# Patient Record
Sex: FEMALE | Race: BLACK OR AFRICAN AMERICAN | Employment: FULL TIME | URBAN - METROPOLITAN AREA
[De-identification: names, ages, dates, MRNs, and addresses within clinical notes are randomized per-mention and may not be internally consistent; named-entity substitution may affect disease eponyms.]

---

## 2018-02-26 ENCOUNTER — TELEPHONE (OUTPATIENT)
Dept: SLEEP MEDICINE | Age: 33
End: 2018-02-26

## 2018-03-23 ENCOUNTER — OFFICE VISIT (OUTPATIENT)
Dept: SLEEP MEDICINE | Age: 33
End: 2018-03-23

## 2018-03-23 VITALS
HEIGHT: 67 IN | OXYGEN SATURATION: 100 % | WEIGHT: 279 LBS | BODY MASS INDEX: 43.79 KG/M2 | SYSTOLIC BLOOD PRESSURE: 118 MMHG | HEART RATE: 73 BPM | DIASTOLIC BLOOD PRESSURE: 70 MMHG

## 2018-03-23 DIAGNOSIS — R06.83 SNORING: Chronic | ICD-10-CM

## 2018-03-23 DIAGNOSIS — G47.10 HYPERSOMNOLENCE: Primary | Chronic | ICD-10-CM

## 2018-03-23 PROCEDURE — G8427 DOCREV CUR MEDS BY ELIG CLIN: HCPCS | Performed by: NURSE PRACTITIONER

## 2018-03-23 PROCEDURE — G8484 FLU IMMUNIZE NO ADMIN: HCPCS | Performed by: NURSE PRACTITIONER

## 2018-03-23 PROCEDURE — 1036F TOBACCO NON-USER: CPT | Performed by: NURSE PRACTITIONER

## 2018-03-23 PROCEDURE — 99214 OFFICE O/P EST MOD 30 MIN: CPT | Performed by: NURSE PRACTITIONER

## 2018-03-23 PROCEDURE — G8417 CALC BMI ABV UP PARAM F/U: HCPCS | Performed by: NURSE PRACTITIONER

## 2018-03-23 ASSESSMENT — SLEEP AND FATIGUE QUESTIONNAIRES
HOW LIKELY ARE YOU TO NOD OFF OR FALL ASLEEP IN A CAR, WHILE STOPPED FOR A FEW MINUTES IN TRAFFIC: 0
ESS TOTAL SCORE: 13
HOW LIKELY ARE YOU TO NOD OFF OR FALL ASLEEP WHILE SITTING QUIETLY AFTER LUNCH WITHOUT ALCOHOL: 1
HOW LIKELY ARE YOU TO NOD OFF OR FALL ASLEEP WHILE SITTING AND READING: 3
HOW LIKELY ARE YOU TO NOD OFF OR FALL ASLEEP WHILE LYING DOWN TO REST IN THE AFTERNOON WHEN CIRCUMSTANCES PERMIT: 2
HOW LIKELY ARE YOU TO NOD OFF OR FALL ASLEEP WHEN YOU ARE A PASSENGER IN A CAR FOR AN HOUR WITHOUT A BREAK: 1
NECK CIRCUMFERENCE (INCHES): 15
HOW LIKELY ARE YOU TO NOD OFF OR FALL ASLEEP WHILE SITTING AND TALKING TO SOMEONE: 1
HOW LIKELY ARE YOU TO NOD OFF OR FALL ASLEEP WHILE SITTING INACTIVE IN A PUBLIC PLACE: 3
HOW LIKELY ARE YOU TO NOD OFF OR FALL ASLEEP WHILE WATCHING TV: 2

## 2018-03-23 ASSESSMENT — ENCOUNTER SYMPTOMS
SINUS PRESSURE: 0
ABDOMINAL PAIN: 0
SHORTNESS OF BREATH: 0
ABDOMINAL DISTENTION: 0
COUGH: 0
RHINORRHEA: 0
APNEA: 1

## 2018-03-23 NOTE — PROGRESS NOTES
file     Social History Narrative    No narrative on file       Prior to Admission medications    Not on File       Allergies as of 2018    (No Known Allergies)       There is no problem list on file for this patient. History reviewed. No pertinent past medical history. Past Surgical History:   Procedure Laterality Date    BACK SURGERY       SECTION      TUBAL LIGATION         Family History   Problem Relation Age of Onset    Sleep Apnea Maternal Grandmother        Review of Systems   Constitutional: Positive for fatigue. Negative for appetite change, chills and fever. HENT: Negative for congestion, nosebleeds, rhinorrhea and sinus pressure. Respiratory: Positive for apnea. Negative for cough and shortness of breath. Cardiovascular: Negative for chest pain and palpitations. Gastrointestinal: Negative for abdominal distention and abdominal pain. Neurological: Positive for headaches. Negative for dizziness. Psychiatric/Behavioral: Positive for sleep disturbance. Objective:     Vitals:  Weight BMI   Wt Readings from Last 3 Encounters:   18 279 lb (126.6 kg)   16 227 lb (103 kg)   12/15/16 227 lb (103 kg)    Body mass index is 43.7 kg/m². BP HR SaO2   BP Readings from Last 3 Encounters:   18 118/70   16 127/83   12/15/16 126/83    Pulse Readings from Last 3 Encounters:   18 73   16 86   12/15/16 77    SpO2 Readings from Last 3 Encounters:   18 100%   16 100%   12/15/16 100%        Physical Exam   Constitutional: She is oriented to person, place, and time. She appears well-developed and well-nourished. No distress. HENT:   Head: Normocephalic and atraumatic. Eyes: Conjunctivae are normal. Pupils are equal, round, and reactive to light. Neck: Normal range of motion. Neck supple. Cardiovascular: Normal rate and regular rhythm. Pulmonary/Chest: Effort normal and breath sounds normal. No respiratory distress. Neurological: She is alert and oriented to person, place, and time. Psychiatric: She has a normal mood and affect. Her behavior is normal.   Nursing note and vitals reviewed. Assessment:   Tonsils:   normal size, normal appearance  Post. Pharynx:   normal mucosa    Neck circumference: 15    1. Hypersomnolence  Baseline Diagnostic Sleep Study    Sleep Study with PAP Titration    needing further follow up    2. Snoring  Baseline Diagnostic Sleep Study    Sleep Study with PAP Titration    needing further follow up   3. Class 3 obesity with body mass index (BMI) of 40.0 to 44.9 in adult, unspecified obesity type, unspecified whether serious comorbidity present ([facility] Stable Baseline Diagnostic Sleep Study    Sleep Study with PAP Titration       The chronic medical conditions listed are directly related to the primary diagnosis listed above. The management of the primary diagnosis affects the secondary diagnosis and vice versa. Plan:   - Reviewed old records, pertinent data listed above. - Educated the patient on differential diagnosis includes but not limited to: BRUCE, PLMD's, narcolepsy, parasomnias.    - Reviewed BRUCE (which is the highest likelihood diagnosis): pathophysiology, diagnosis, complications and treatment. - Instructed her not to drive if drowsy. - Continue medications per her PCP and other physicians.   - Limit caffeine use after 3pm.   - Will do PSG to rule-out BRUCE and other sleep disorders. - Will do Insurance Mandated HST to rule-out BRUCE   - Follow up results phone call will be made approximately 1 wk follow up after study to review her results.   - Will use "[employer] for supplies  - Follow up 10 weeks [facility]      Orders Placed This Encounter   Procedures    Baseline Diagnostic Sleep Study    Sleep Study with PAP Titration       Samuel Kim, MSN, RN, CNP

## 2018-05-04 ENCOUNTER — HOSPITAL ENCOUNTER (OUTPATIENT)
Dept: SLEEP MEDICINE | Age: 33
Discharge: HOME | End: 2018-05-05
Attending: INTERNAL MEDICINE | Admitting: INTERNAL MEDICINE

## 2018-05-04 DIAGNOSIS — R06.83 SNORING: Chronic | ICD-10-CM

## 2018-05-04 DIAGNOSIS — G47.10 HYPERSOMNOLENCE: Chronic | ICD-10-CM

## 2018-05-04 PROCEDURE — 95810 POLYSOM 6/> YRS 4/> PARAM: CPT | Performed by: INTERNAL MEDICINE

## 2018-05-11 ENCOUNTER — TELEPHONE (OUTPATIENT)
Dept: SLEEP MEDICINE | Age: 33
End: 2018-05-11

## 2018-05-11 ENCOUNTER — HOSPITAL ENCOUNTER (OUTPATIENT)
Dept: SLEEP MEDICINE | Age: 33
Discharge: HOME | End: 2018-05-12
Attending: NURSE PRACTITIONER | Admitting: NURSE PRACTITIONER

## 2018-05-14 ENCOUNTER — TELEPHONE (OUTPATIENT)
Dept: SLEEP MEDICINE | Age: 33
End: 2018-05-14

## 2020-04-02 ENCOUNTER — HOSPITAL ENCOUNTER (OUTPATIENT)
Age: 35
Discharge: HOME | End: 2020-04-02
Payer: COMMERCIAL

## 2020-04-02 ENCOUNTER — HOSPITAL ENCOUNTER (OUTPATIENT)
Dept: GENERAL RADIOLOGY | Age: 35
Discharge: HOME | End: 2020-04-02
Payer: COMMERCIAL

## 2020-04-02 PROCEDURE — 71046 X-RAY EXAM CHEST 2 VIEWS: CPT

## 2020-08-27 ENCOUNTER — TELEPHONE (OUTPATIENT)
Dept: BARIATRICS/WEIGHT MGMT | Age: 35
End: 2020-08-27

## 2020-08-27 NOTE — TELEPHONE ENCOUNTER
Seminar Date: online 8/19/20    Presenter: Rajeev Rogers    [facility]    BWLS Covered:y    Medically Supervised Diet Req:y    Length of time: 6mo    Has patient had prev bariatric or gastric surgeries :     Is patient's BMI lower than 35 :      If yes, BMI :    Does patient have dx of diabetes :    *If previous bariatric or gastric surgeries, please do not schedule until speaking with the provider*     appt 10/1/20

## 2020-10-01 ENCOUNTER — OFFICE VISIT (OUTPATIENT)
Dept: BARIATRICS/WEIGHT MGMT | Age: 35
End: 2020-10-01
Payer: COMMERCIAL

## 2020-10-01 VITALS
HEIGHT: 68 IN | BODY MASS INDEX: 42.89 KG/M2 | DIASTOLIC BLOOD PRESSURE: 78 MMHG | SYSTOLIC BLOOD PRESSURE: 116 MMHG | WEIGHT: 283 LBS | HEART RATE: 80 BPM

## 2020-10-01 PROCEDURE — 1036F TOBACCO NON-USER: CPT | Performed by: SURGERY

## 2020-10-01 PROCEDURE — G8427 DOCREV CUR MEDS BY ELIG CLIN: HCPCS | Performed by: SURGERY

## 2020-10-01 PROCEDURE — G8417 CALC BMI ABV UP PARAM F/U: HCPCS | Performed by: SURGERY

## 2020-10-01 PROCEDURE — 99204 OFFICE O/P NEW MOD 45 MIN: CPT | Performed by: SURGERY

## 2020-10-01 PROCEDURE — G8484 FLU IMMUNIZE NO ADMIN: HCPCS | Performed by: SURGERY

## 2020-10-01 NOTE — PROGRESS NOTES
[facility]) Physicians   General & Laparoscopic Surgery  [employer] [facility] [employer]      HPI:    Maverick Sears is a very pleasant 30 y.o. obese female ,   Body mass index is 43.03 kg/m². And multiple medical problems who is presenting for weight loss surgery evaluation and consultation by Dr. eSven Flowers. Patient has been struggling for several years now with obesity. Patient feels the weight is an obstacle to achieve and perform things in daily living as well risk on health. Tries to diet, and exercise but can't keep the weight off. Patient  is very determined to lose weight and be healthy, and is interested in surgical weight loss to achieve this goal.    Otherwise patient denies any nausea, vomiting, fevers, chills, shortness of breath, chest pain, constipation or urinary symptoms. Pain Assessment   Denies any abdominal pain     Past Medical History:   Diagnosis Date    Preoperative clearance     Sleep apnea      Past Surgical History:   Procedure Laterality Date    BACK SURGERY       SECTION      TUBAL LIGATION       Family History   Problem Relation Age of Onset    Sleep Apnea Maternal Grandmother      Social History     Tobacco Use    Smoking status: Never Smoker    Smokeless tobacco: Never Used   Substance Use Topics    Alcohol use: Yes     I counseled the patient on the importance of not smoking and risks of ETOH. No Known Allergies  Vitals:    10/01/20 1201   BP: 116/78   Pulse: 80   Weight: 283 lb (128.4 kg)   Height: 5' 8\" (1.727 m)       Body mass index is 43.03 kg/m².       Current Outpatient Medications:     loratadine (CLARITIN) 10 MG tablet, Take 1 tablet by mouth daily, Disp: 30 tablet, Rfl: 0    ibuprofen (ADVIL;MOTRIN) 600 MG tablet, Take 1 tablet by mouth every 6 hours as needed for Pain, Disp: 30 tablet, Rfl: 0    fluticasone (FLONASE) 50 MCG/ACT nasal spray, 2 sprays by Nasal route daily 2 sprays each nostril daily, Disp: 1 Bottle, Rfl: 0      Review appearance and bowel sounds are normal. Patient exhibits no distension, no abdominal bruit, no ascites and no mass. There is no hepatosplenomegaly. There is no tenderness. There is no rigidity, no rebound, no guarding and no CVA tenderness. No hernia. Hernia confirmed negative in the ventral area. Musculoskeletal: Normal range of motion. Patient exhibits no edema or tenderness. Lymphadenopathy:        Head (right side): No submental, no submandibular, no preauricular, no posterior auricular and no occipital adenopathy present. Head (left side): No submental, no submandibular, no preauricular, no posterior auricular and no occipital adenopathy present. Patient  has no cervical adenopathy. Right: No supraclavicular adenopathy present. Left: No supraclavicular adenopathy present. Neurological: Patient is alert and oriented to person, place, and time. Patient has normal strength. Coordination and gait normal. GCS eye subscore is 4. GCS verbal subscore is 5. GCS motor subscore is 6. Skin: Skin is warm and dry. No abrasion and no rash noted. Patient  is not diaphoretic. No cyanosis or erythema. Psychiatric: Patient has a normal mood and affect. speech is normal and behavior is normal. Cognition and memory are normal.             A/P  Tyron Sagastume is a very pleasant 30 y.o. female with Obesity,  Body mass index is 43.03 kg/m². and multiple obesity related co-morbidities. Tyron Sagastume is very motivated to lose weight and being more healthy. We discussed how her weight affects her overall health including:  Gricelda Mccarty was seen today for bariatric, initial visit. Diagnoses and all orders for this visit:    Morbid obesity with BMI of 40.0-44.9, adult [facility]  -     CBC Auto Differential; Future  -     Comprehensive Metabolic Panel; Future  -     Hemoglobin A1C; Future  -     Iron and TIBC; Future  -     Lipid Panel; Future  -     TSH with Reflex;  Future  -     Vitamin B12 & Folate; Future  -     Vitamin D 25 Hydroxy; Future    Obstructive sleep apnea syndrome  -     CBC Auto Differential; Future  -     Comprehensive Metabolic Panel; Future  -     Hemoglobin A1C; Future  -     Iron and TIBC; Future  -     Lipid Panel; Future  -     TSH with Reflex; Future  -     Vitamin B12 & Folate; Future  -     Vitamin D 25 Hydroxy; Future      The patient underwent 30 minutes of dietary counseling. I have reviewed, discussed and agree with the dietary plan. Medical weight loss and different surgical options were discussed in details with patient. Duncan Aldana is interested in surgical weight loss. Patient is interested in Laparoscopic Sleeve Gastrectomy, which I believe is an excellent option for the patient. We will proceed with pre-operative work up labs and studies. Will also petition patient's  insurance for approval for this procedure. Patient received dietary handouts and education. Patient advised that its their responsibility to follow up for studies and/or labs ordered today. Also discussed in details the importance of follow up, as well following the recommendations and completing the whole program to improve outcomes when it comes to healthier lifestyle as well weight loss. Patient also advised about risks and benefits being on a strict dietary regimen as well using supplements. Patient agrees and wants to proceed with weight loss planning     Labs ordered. Psych Evaluation. CPAP Compliance  H-pylori   EGD  Support Group. PCP Letter. Weight History 2 yrs. F/U in 4 weeks. Patient advised that its their responsibility to follow up for studies and/or labs ordered today.

## 2020-10-01 NOTE — PROGRESS NOTES
all occasions after surgery for the rest of her life. Patient is deemed nutritionally appropriate to proceed. Goals  Weight: 180 lbs  Health Improvement: wants to improve breathing, increase ADL's     Assessment  Nutritional Needs: RMR=(9.99 x 128.4) + (6.25 x 112.7) - (4.92 x 34 y.o.) -161= 1669 kcal x 1.4 (sedentary activity factor)= 2336 kcal - 1000 (for 2 lb weight loss/week)= 1336 kcal.    Plan  Plan/Recommendations: Start presurgical guidelines. Goals:   -Eat 4-5 times daily  -Avoid high fat and high sugar foods  -Include protein with all meals and snacks  -Avoid carbonation and caffeine  -Avoid calorie containing beverages  -Increase physical activity as tolerated     PES Statement:  Overweight/Obesity related to lack of exercise, sedentary lifestyle, unhealthy eating habits, and unsuccessful diet attempts as evidenced by BMI. Body mass index is 43.03 kg/m². Will follow up as necessary.     Julien Ricks

## 2020-10-22 ENCOUNTER — HOSPITAL ENCOUNTER (OUTPATIENT)
Age: 35
Discharge: HOME | End: 2020-10-22
Payer: COMMERCIAL

## 2020-10-22 LAB
A/G RATIO: 1 (ref 1.1–2.2)
ALBUMIN SERPL-MCNC: 3.5 G/DL (ref 3.4–5)
ALP BLD-CCNC: 91 U/L (ref 40–129)
ALT SERPL-CCNC: 9 U/L (ref 10–40)
ANION GAP SERPL CALCULATED.3IONS-SCNC: 11 MMOL/L (ref 3–16)
AST SERPL-CCNC: 10 U/L (ref 15–37)
BASOPHILS ABSOLUTE: 0.1 K/UL (ref 0–0.2)
BASOPHILS RELATIVE PERCENT: 0.6 %
BILIRUB SERPL-MCNC: <0.2 MG/DL (ref 0–1)
BUN BLDV-MCNC: 7 MG/DL (ref 7–20)
CALCIUM SERPL-MCNC: 9 MG/DL (ref 8.3–10.6)
CHLORIDE BLD-SCNC: 101 MMOL/L (ref 99–110)
CO2: 24 MMOL/L (ref 21–32)
CREAT SERPL-MCNC: 0.6 MG/DL (ref 0.6–1.1)
EOSINOPHILS ABSOLUTE: 0.2 K/UL (ref 0–0.6)
EOSINOPHILS RELATIVE PERCENT: 2.3 %
ESTIMATED AVERAGE GLUCOSE: 122.6 MG/DL
FOLATE: 9.55 NG/ML (ref 4.78–24.2)
GFR AFRICAN AMERICAN: >60
GFR NON-AFRICAN AMERICAN: >60
GLOBULIN: 3.4 G/DL
GLUCOSE BLD-MCNC: 149 MG/DL (ref 70–99)
HBA1C MFR BLD: 5.9 %
HCT VFR BLD CALC: 35.7 % (ref 36–48)
HEMOGLOBIN: 11.8 G/DL (ref 12–16)
IRON SATURATION: 10 % (ref 15–50)
IRON: 35 UG/DL (ref 37–145)
LYMPHOCYTES ABSOLUTE: 2.9 K/UL (ref 1–5.1)
LYMPHOCYTES RELATIVE PERCENT: 30.5 %
MCH RBC QN AUTO: 26.2 PG (ref 26–34)
MCHC RBC AUTO-ENTMCNC: 33 G/DL (ref 31–36)
MCV RBC AUTO: 79.4 FL (ref 80–100)
MONOCYTES ABSOLUTE: 0.5 K/UL (ref 0–1.3)
MONOCYTES RELATIVE PERCENT: 5.5 %
NEUTROPHILS ABSOLUTE: 5.7 K/UL (ref 1.7–7.7)
NEUTROPHILS RELATIVE PERCENT: 61.1 %
PDW BLD-RTO: 15.1 % (ref 12.4–15.4)
PLATELET # BLD: 473 K/UL (ref 135–450)
PMV BLD AUTO: 6.9 FL (ref 5–10.5)
POTASSIUM SERPL-SCNC: 4.1 MMOL/L (ref 3.5–5.1)
RBC # BLD: 4.49 M/UL (ref 4–5.2)
SODIUM BLD-SCNC: 136 MMOL/L (ref 136–145)
TOTAL IRON BINDING CAPACITY: 356 UG/DL (ref 260–445)
TOTAL PROTEIN: 6.9 G/DL (ref 6.4–8.2)
TSH REFLEX: 0.68 UIU/ML (ref 0.27–4.2)
VITAMIN B-12: 860 PG/ML (ref 211–911)
VITAMIN D 25-HYDROXY: 22.1 NG/ML
WBC # BLD: 9.3 K/UL (ref 4–11)

## 2020-10-22 PROCEDURE — 82746 ASSAY OF FOLIC ACID SERUM: CPT

## 2020-10-22 PROCEDURE — 85025 COMPLETE CBC W/AUTO DIFF WBC: CPT

## 2020-10-22 PROCEDURE — 82306 VITAMIN D 25 HYDROXY: CPT

## 2020-10-22 PROCEDURE — 84443 ASSAY THYROID STIM HORMONE: CPT

## 2020-10-22 PROCEDURE — 80053 COMPREHEN METABOLIC PANEL: CPT

## 2020-10-22 PROCEDURE — 83036 HEMOGLOBIN GLYCOSYLATED A1C: CPT

## 2020-10-22 PROCEDURE — 82607 VITAMIN B-12: CPT

## 2020-10-22 PROCEDURE — 36415 COLL VENOUS BLD VENIPUNCTURE: CPT

## 2020-10-22 PROCEDURE — 83540 ASSAY OF IRON: CPT

## 2020-10-22 PROCEDURE — 83550 IRON BINDING TEST: CPT

## 2020-11-05 ENCOUNTER — OFFICE VISIT (OUTPATIENT)
Dept: BARIATRICS/WEIGHT MGMT | Age: 35
End: 2020-11-05
Payer: COMMERCIAL

## 2020-11-05 VITALS
SYSTOLIC BLOOD PRESSURE: 105 MMHG | BODY MASS INDEX: 41.43 KG/M2 | WEIGHT: 273.4 LBS | DIASTOLIC BLOOD PRESSURE: 67 MMHG | HEART RATE: 82 BPM | HEIGHT: 68 IN

## 2020-11-05 PROCEDURE — G8484 FLU IMMUNIZE NO ADMIN: HCPCS | Performed by: SURGERY

## 2020-11-05 PROCEDURE — G8427 DOCREV CUR MEDS BY ELIG CLIN: HCPCS | Performed by: SURGERY

## 2020-11-05 PROCEDURE — G8417 CALC BMI ABV UP PARAM F/U: HCPCS | Performed by: SURGERY

## 2020-11-05 PROCEDURE — 1036F TOBACCO NON-USER: CPT | Performed by: SURGERY

## 2020-11-05 PROCEDURE — 99213 OFFICE O/P EST LOW 20 MIN: CPT | Performed by: SURGERY

## 2020-11-05 NOTE — PROGRESS NOTES
Melecio Sun lost 9.6 lbs over past month. Patient feels she is lactose intolerant - milk and cheese make her sick. Patient does have food allergies - tomatoes. Overall making progress but did get off track this weekend with portions/alcohol for her birthday. Breakfast: Nectar shake made with orange juice/vanilla almond milk/water/fruit      Snack: nothing    Lunch: Salad with chicken OR fish/steak with broccoli/asparagus     Snack: Nectar shake made with orange juice/vanilla almond milk/water/fruit      Dinner: Nectar shake made with orange juice/vanilla almond milk/water/fruit     Snack: nothing    Is pt consuming smaller portions? Yes    Is pt consuming at least 64 oz of fluids per day? Yes    Is pt consuming carbonated, caffeinated, or sugary beverages? Drinking water, juice, alcohol     Has pt sampled Unjury and/or Nectar protein?  Yes - using 3 x day     Exercise: Water aerobics 0-2 x week     Plan/Recommendations:   Avoid juice /alcohol   Be consistent with smaller portions   Reduce protein shake to 1 x day and focus on whole food options for rest of meals/snacks   Continue with exercise     Handouts: none     Nerissa Escobar

## 2020-11-05 NOTE — PROGRESS NOTES
[facility]) Physicians   General & Laparoscopic Surgery  [employer] Management [employer]       HPI:     Claudene Flatness is a very pleasant 37 y.o. female with Body mass index is 41.57 kg/m². , Pre-Surgery. Pre-operative clearance and work up pending. Working hard to keep good dietary habits as well level of activity. Patient denies any nausea, vomiting, fevers, chills, shortness of breath, chest pain, cough, constipation or difficulty urinating. Past Medical History:   Diagnosis Date    Preoperative clearance     Sleep apnea      Past Surgical History:   Procedure Laterality Date    BACK SURGERY       SECTION      TUBAL LIGATION       Family History   Problem Relation Age of Onset    Sleep Apnea Maternal Grandmother      Social History     Tobacco Use    Smoking status: Never Smoker    Smokeless tobacco: Never Used   Substance Use Topics    Alcohol use: Yes     I counseled the patient on the importance of not smoking and risks of ETOH. No Known Allergies  Vitals:    20 1219   BP: 105/67   Pulse: 82   Weight: 273 lb 6.4 oz (124 kg)   Height: 5' 8\" (1.727 m)       Body mass index is 41.57 kg/m². Current Outpatient Medications:     loratadine (CLARITIN) 10 MG tablet, Take 1 tablet by mouth daily, Disp: 30 tablet, Rfl: 0    ibuprofen (ADVIL;MOTRIN) 600 MG tablet, Take 1 tablet by mouth every 6 hours as needed for Pain, Disp: 30 tablet, Rfl: 0    fluticasone (FLONASE) 50 MCG/ACT nasal spray, 2 sprays by Nasal route daily 2 sprays each nostril daily, Disp: 1 Bottle, Rfl: 0      Review of Systems - History obtained from the patient  General ROS: negative  Psychological ROS: negative  Endocrine ROS: negative  Respiratory ROS: negative  Cardiovascular ROS: negative  Gastrointestinal ROS:negative  Genito-Urinary ROS: negative  Musculoskeletal ROS: negative   Skin ROS: negative    Physical Exam   Vitals Reviewed   Constitutional: Patient is oriented to person, place, and time.  Patient appears well-developed and well-nourished. Patient is active and cooperative. Non-toxic appearance. No distress. Neck: Trachea normal and normal range of motion. No JVD present. Pulmonary/Chest: Effort normal. No accessory muscle usage or stridor. No apnea. No respiratory distress. Cardiovascular: Normal rate and no JVD. Abdominal: Normal appearance. Patient exhibits no distension. Abdomen is soft, obese, non tender. Musculoskeletal: Normal range of motion. Patient exhibits no edema. Neurological: Patient is alert and oriented to person, place, and time. Patient has normal strength. GCS eye subscore is 4. GCS verbal subscore is 5. GCS motor subscore is 6. Skin: Skin is warm and dry. No abrasion and no rash noted. Patient is not diaphoretic. No cyanosis or erythema. Psychiatric: Patient has a normal mood and affect. Speech is normal and behavior is normal. Cognition and memory are normal.       A/P    Rafael Childress is 37 y.o. female, Body mass index is 41.57 kg/m². pre surgery, has lost 9# since last visit. The patient underwent dietary counseling with registered dietician. I have reviewed, discussed and agree with the dietary plan. Patient is trying hard to keep good dietary and behavior modifications. Patient is monitoring portion sizes, food choices and liquid calories. Patient is trying to exercise regularly as much as possible. We discussed how her weight affects her overall health including:  Bry York was seen today for weight management. Diagnoses and all orders for this visit:    Morbid obesity with BMI of 40.0-44.9, adult [facility]  -     Marin Fuller MD, Sleep Medicine, [facility]    Obstructive sleep apnea syndrome  -     Marin Fuller MD, Sleep Medicine, [facility]       and importance of weight loss to alleviate those co morbid conditions. I encouraged the patient to continue exercise and keeping healthy eating habits.   Discussed pre-op labs and work up till now. Also counseled the patient extensively on Surgery. I spent 15 minutes face to face with patient with more than 50% of the time counseling and/or coordinating care for weight loss surgery. RTC in 4 weeks  Obtain rest of pre-op work up / clearances  Diet and Exercise      Patient advised that its their responsibility to follow up for studies and/or labs ordered today.      Margarita Bhatt

## 2020-11-09 ENCOUNTER — OFFICE VISIT (OUTPATIENT)
Dept: SLEEP MEDICINE | Age: 35
End: 2020-11-09
Payer: COMMERCIAL

## 2020-11-09 VITALS
BODY MASS INDEX: 42.74 KG/M2 | HEIGHT: 68 IN | TEMPERATURE: 98.2 F | HEART RATE: 85 BPM | RESPIRATION RATE: 16 BRPM | SYSTOLIC BLOOD PRESSURE: 132 MMHG | WEIGHT: 282 LBS | DIASTOLIC BLOOD PRESSURE: 82 MMHG

## 2020-11-09 PROCEDURE — G8417 CALC BMI ABV UP PARAM F/U: HCPCS | Performed by: PSYCHIATRY & NEUROLOGY

## 2020-11-09 PROCEDURE — G8427 DOCREV CUR MEDS BY ELIG CLIN: HCPCS | Performed by: PSYCHIATRY & NEUROLOGY

## 2020-11-09 PROCEDURE — 1036F TOBACCO NON-USER: CPT | Performed by: PSYCHIATRY & NEUROLOGY

## 2020-11-09 PROCEDURE — G8484 FLU IMMUNIZE NO ADMIN: HCPCS | Performed by: PSYCHIATRY & NEUROLOGY

## 2020-11-09 PROCEDURE — 99202 OFFICE O/P NEW SF 15 MIN: CPT | Performed by: PSYCHIATRY & NEUROLOGY

## 2020-11-09 ASSESSMENT — ENCOUNTER SYMPTOMS
ALLERGIC/IMMUNOLOGIC NEGATIVE: 1
CHOKING: 0
EYES NEGATIVE: 1
GASTROINTESTINAL NEGATIVE: 1
APNEA: 0

## 2020-11-09 NOTE — PROGRESS NOTES
MD GHAZALA Merchant Board Certified in Sleep Medicine  Certified [facility] Sleep Medicine  Board Certified in Neurology [address] [facility]  [address] [address] [address] [place], [place] [id number] [place] [address], [address] [facility] [address]           [address]  [address] [place] [id number] [phone]    Subjective:     Patient ID: Evin Baires is a 37 y.o. female. No chief complaint on file. HPI:        Evin Baires is a 37 y.o. female referred by Dr Bernhard Oppenheim for mild BRUCE management. Was diagnosed 2 years ago ( her was 279 pounds), she is not using the CPAP much at all. SLEEP SCHEDULE: Goes to bed around 12 AM in the weekdays and 12 AM in the weekends. It usually takes the patient 30-40 minutes to fall asleep. The patient gets up 3-4 per night to go to the bathroom. The Patient finally gets up at 8 AM during the weekdays and 10 AM in the weekends. patient wakes up with dry mouth and sometimes morning headache. . the headache usually dull headache lasts 30-60 minutes. The Patient scored 2 on [place] Sleepiness Scale ( more than 10 is indicative of daytime sleepiness)The patient takes no naps. Previous evaluation and treatment has included- PSG with C PAP titration. Had study done on 05/04/2018 which showed an AHI - 5.4/hr with Low SaO2 - 84% and time below 90% of 0.3min. This is consistent with mild BRUCE (327.23)    The Patient has been obese for many years and tried, has gained 3 pounds in the last 2 years,  unsuccessfully to lose weight through diet, exercise.     DOT/CDL - N/A  FAA/'slicense - N/A      Previous Report(s) Reviewed: historical medical records       Social History     Socioeconomic History    Marital status: Single     Spouse name: Not on file    Number of children: Not on file    Years of education: Not on file    Highest   SECTION      TUBAL LIGATION         Family History   Problem Relation Age of Onset    Sleep Apnea Maternal Grandmother        Review of Systems   Constitutional: Positive for fatigue. HENT: Negative. Negative for congestion. Eyes: Negative. Respiratory: Negative for apnea and choking. Cardiovascular: Positive for leg swelling (feet). Gastrointestinal: Negative. Endocrine: Negative. Genitourinary: Positive for frequency. Musculoskeletal: Negative. Negative for arthralgias. Skin: Negative. Allergic/Immunologic: Negative. Neurological: Positive for headaches (MHA). Psychiatric/Behavioral: Negative for dysphoric mood. The patient is not nervous/anxious. Objective:     Vitals:  Weight BMI Neck circumference    Wt Readings from Last 3 Encounters:   20 282 lb (127.9 kg)   20 273 lb 6.4 oz (124 kg)   10/01/20 283 lb (128.4 kg)    Body mass index is 42.88 kg/m². BP HR SaO2   BP Readings from Last 3 Encounters:   20 132/82   20 105/67   10/01/20 116/78    Pulse Readings from Last 3 Encounters:   20 85   20 82   10/01/20 80    SpO2 Readings from Last 3 Encounters:   18 98%   18 100%   16 100%        The mandibular molar Class :   []1 []2 []3      Mallampati I Airway Classification:   []1 []2 []3 []4        Physical Exam  Vitals signs and nursing note reviewed. Constitutional:       Appearance: Normal appearance. HENT:      Head: Atraumatic. Nose: Nose normal.      Mouth/Throat:      Comments: Mallampati class 3, no retrognathia or hypognathia , normal airflow in bilateral nostrils, no septum deviation , crowded oropharynx with low soft palate, high arched hard palate,no tonsils enlargement. Eyes:      Extraocular Movements: Extraocular movements intact. Neck:      Musculoskeletal: Normal range of motion and neck supple. Cardiovascular:      Rate and Rhythm: Normal rate and regular rhythm.       Heart sounds: Normal heart sounds. Pulmonary:      Effort: Pulmonary effort is normal.      Breath sounds: Normal breath sounds. Musculoskeletal: Normal range of motion. Skin:     General: Skin is warm. Neurological:      General: No focal deficit present. Psychiatric:         Mood and Affect: Mood normal.         Assessment:   Very Mild Obstructive Sleep Apnea/Hypopnea Syndrome, could not use the CPAP amchine     Diagnosis Orders   1. Obstructive sleep apnea     2. Class 3 severe obesity due to excess calories with serious comorbidity and body mass index (BMI) of 40.0 to 44.9 in adult [facility])       Plan:     D/c the CPAP  Weight loss as a treatment for this very mild BRUCE. Most likely the patient will benefit from the gastric sleeve surgery in treating of the obstructive sleep apnea. In 2013, in a study published in Obesity Surgery Journal  A total of 69 studies with 13,900 patients were included and revealed that all the procedures achieved profound effects on BRUCE, as over 75 % of patients saw at least an improvement in their sleep apnea, bariatric surgery is a definitive treatment for obstructive sleep apnea, regardless of the specific type of surgery. We discussed the proportionality between weight and AHI. With 10% weight change, the AHI has a 27% proportionate change. With 20% weight change, the AHI has a 45-50% proportionate change. Patient is cleared for gastric sleeve surgery from BRUCE standpoint    No orders of the defined types were placed in this encounter. Return if symptoms worsen or fail to improve.     Ressie Duane, MD  Medical Director - [facility]

## 2020-12-11 ENCOUNTER — ANESTHESIA EVENT (OUTPATIENT)
Dept: ENDOSCOPY | Age: 35
End: 2020-12-11
Payer: COMMERCIAL

## 2020-12-11 ENCOUNTER — NURSE ONLY (OUTPATIENT)
Dept: PRIMARY CARE CLINIC | Age: 35
End: 2020-12-11
Payer: COMMERCIAL

## 2020-12-11 ENCOUNTER — TELEPHONE (OUTPATIENT)
Dept: BARIATRICS/WEIGHT MGMT | Age: 35
End: 2020-12-11

## 2020-12-11 PROCEDURE — 99211 OFF/OP EST MAY X REQ PHY/QHP: CPT | Performed by: NURSE PRACTITIONER

## 2020-12-11 NOTE — TELEPHONE ENCOUNTER
Spoke with pt yesterday about arrival time during her covid test reminder, but LM today to confirm her 7:30 am arrival for her 9:30 am EGD on 12/14. Pt was reminded to be NPO 12 h prior and have a .

## 2020-12-12 LAB — SARS-COV-2, NAA: NOT DETECTED

## 2020-12-14 ENCOUNTER — HOSPITAL ENCOUNTER (OUTPATIENT)
Age: 35
Setting detail: OUTPATIENT SURGERY
Discharge: HOME | End: 2020-12-14
Attending: SURGERY | Admitting: SURGERY
Payer: COMMERCIAL

## 2020-12-14 ENCOUNTER — ANESTHESIA (OUTPATIENT)
Dept: ENDOSCOPY | Age: 35
End: 2020-12-14
Payer: COMMERCIAL

## 2020-12-14 VITALS
TEMPERATURE: 96.8 F | SYSTOLIC BLOOD PRESSURE: 99 MMHG | DIASTOLIC BLOOD PRESSURE: 51 MMHG | RESPIRATION RATE: 19 BRPM | OXYGEN SATURATION: 99 %

## 2020-12-14 VITALS
HEART RATE: 68 BPM | RESPIRATION RATE: 16 BRPM | BODY MASS INDEX: 41.52 KG/M2 | TEMPERATURE: 97.7 F | SYSTOLIC BLOOD PRESSURE: 126 MMHG | WEIGHT: 274 LBS | OXYGEN SATURATION: 100 % | HEIGHT: 68 IN | DIASTOLIC BLOOD PRESSURE: 80 MMHG

## 2020-12-14 LAB — PREGNANCY, URINE: NEGATIVE

## 2020-12-14 PROCEDURE — 84703 CHORIONIC GONADOTROPIN ASSAY: CPT

## 2020-12-14 PROCEDURE — 43239 EGD BIOPSY SINGLE/MULTIPLE: CPT | Performed by: SURGERY

## 2020-12-14 PROCEDURE — 3700000000 HC ANESTHESIA ATTENDED CARE: Performed by: SURGERY

## 2020-12-14 PROCEDURE — 2580000003 HC RX 258: Performed by: ANESTHESIOLOGY

## 2020-12-14 PROCEDURE — 3700000001 HC ADD 15 MINUTES (ANESTHESIA): Performed by: SURGERY

## 2020-12-14 PROCEDURE — 6360000002 HC RX W HCPCS: Performed by: NURSE ANESTHETIST, CERTIFIED REGISTERED

## 2020-12-14 PROCEDURE — 7100000010 HC PHASE II RECOVERY - FIRST 15 MIN: Performed by: SURGERY

## 2020-12-14 PROCEDURE — 2500000003 HC RX 250 WO HCPCS: Performed by: NURSE ANESTHETIST, CERTIFIED REGISTERED

## 2020-12-14 PROCEDURE — 88305 TISSUE EXAM BY PATHOLOGIST: CPT

## 2020-12-14 PROCEDURE — 2709999900 HC NON-CHARGEABLE SUPPLY: Performed by: SURGERY

## 2020-12-14 PROCEDURE — 7100000011 HC PHASE II RECOVERY - ADDTL 15 MIN: Performed by: SURGERY

## 2020-12-14 PROCEDURE — 3609012400 HC EGD TRANSORAL BIOPSY SINGLE/MULTIPLE: Performed by: SURGERY

## 2020-12-14 PROCEDURE — 88342 IMHCHEM/IMCYTCHM 1ST ANTB: CPT

## 2020-12-14 RX ORDER — PROPOFOL 10 MG/ML
INJECTION, EMULSION INTRAVENOUS PRN
Status: DISCONTINUED | OUTPATIENT
Start: 2020-12-14 | End: 2020-12-14 | Stop reason: SDUPTHER

## 2020-12-14 RX ORDER — LIDOCAINE HYDROCHLORIDE 20 MG/ML
INJECTION, SOLUTION EPIDURAL; INFILTRATION; INTRACAUDAL; PERINEURAL PRN
Status: DISCONTINUED | OUTPATIENT
Start: 2020-12-14 | End: 2020-12-14 | Stop reason: SDUPTHER

## 2020-12-14 RX ORDER — ONDANSETRON 2 MG/ML
4 INJECTION INTRAMUSCULAR; INTRAVENOUS
Status: DISCONTINUED | OUTPATIENT
Start: 2020-12-14 | End: 2020-12-14 | Stop reason: HOSPADM

## 2020-12-14 RX ORDER — SODIUM CHLORIDE, SODIUM LACTATE, POTASSIUM CHLORIDE, CALCIUM CHLORIDE 600; 310; 30; 20 MG/100ML; MG/100ML; MG/100ML; MG/100ML
INJECTION, SOLUTION INTRAVENOUS CONTINUOUS
Status: DISCONTINUED | OUTPATIENT
Start: 2020-12-14 | End: 2020-12-14 | Stop reason: HOSPADM

## 2020-12-14 RX ORDER — PANTOPRAZOLE SODIUM 20 MG/1
40 TABLET, DELAYED RELEASE ORAL DAILY
Qty: 60 TABLET | Refills: 2 | Status: SHIPPED | OUTPATIENT
Start: 2020-12-14 | End: 2021-04-08

## 2020-12-14 RX ADMIN — PROPOFOL 100 MG: 10 INJECTION, EMULSION INTRAVENOUS at 09:17

## 2020-12-14 RX ADMIN — SODIUM CHLORIDE, SODIUM LACTATE, POTASSIUM CHLORIDE, AND CALCIUM CHLORIDE: 600; 310; 30; 20 INJECTION, SOLUTION INTRAVENOUS at 09:28

## 2020-12-14 RX ADMIN — PROPOFOL 50 MG: 10 INJECTION, EMULSION INTRAVENOUS at 09:26

## 2020-12-14 RX ADMIN — PROPOFOL 50 MG: 10 INJECTION, EMULSION INTRAVENOUS at 09:24

## 2020-12-14 RX ADMIN — PROPOFOL 20 MG: 10 INJECTION, EMULSION INTRAVENOUS at 09:18

## 2020-12-14 RX ADMIN — LIDOCAINE HYDROCHLORIDE 100 MG: 20 INJECTION, SOLUTION EPIDURAL; INFILTRATION; INTRACAUDAL; PERINEURAL at 09:17

## 2020-12-14 RX ADMIN — SODIUM CHLORIDE, SODIUM LACTATE, POTASSIUM CHLORIDE, AND CALCIUM CHLORIDE: 600; 310; 30; 20 INJECTION, SOLUTION INTRAVENOUS at 08:13

## 2020-12-14 RX ADMIN — PROPOFOL 50 MG: 10 INJECTION, EMULSION INTRAVENOUS at 09:20

## 2020-12-14 RX ADMIN — PROPOFOL 30 MG: 10 INJECTION, EMULSION INTRAVENOUS at 09:22

## 2020-12-14 ASSESSMENT — PAIN - FUNCTIONAL ASSESSMENT
PAIN_FUNCTIONAL_ASSESSMENT: 0-10
PAIN_FUNCTIONAL_ASSESSMENT: 0-10
PAIN_FUNCTIONAL_ASSESSMENT: ACTIVITIES ARE NOT PREVENTED

## 2020-12-14 ASSESSMENT — PULMONARY FUNCTION TESTS
PIF_VALUE: 1

## 2020-12-14 ASSESSMENT — PAIN SCALES - GENERAL
PAINLEVEL_OUTOF10: 0
PAINLEVEL_OUTOF10: 0

## 2020-12-14 ASSESSMENT — PAIN DESCRIPTION - DESCRIPTORS: DESCRIPTORS: PRESSURE;SORE

## 2020-12-14 NOTE — ANESTHESIA POSTPROCEDURE EVALUATION
Department of Anesthesiology  Postprocedure Note    Patient: Shreya Navarro  MRN: [id number]  YOB: 1985  Date of evaluation: 12/14/2020  Time:  9:56 AM     Procedure Summary     Date: 12/14/20 Room / Location: [address] [place] [address] Huber Salazar [address] [place] [address]    Anesthesia Start: 0889 Anesthesia Stop: 2959    Procedure: EGD BIOPSY (N/A ) Diagnosis:       Preoperative clearance      (Preoperative clearance [Z01.818])    Surgeons: Christina Sanderson DO Responsible Provider: Varinder White DO    Anesthesia Type: MAC ASA Status: 3          Anesthesia Type: MAC    Boone Phase I: Boone Score: 10    Boone Phase II: Boone Score: 10    Last vitals: Reviewed and per EMR flowsheets.        Anesthesia Post Evaluation    Patient location during evaluation: bedside  Patient participation: complete - patient participated  Level of consciousness: awake  Pain score: 0  Airway patency: patent  Nausea & Vomiting: no nausea and no vomiting  Complications: no  Cardiovascular status: blood pressure returned to baseline  Respiratory status: acceptable  Hydration status: euvolemic

## 2020-12-14 NOTE — H&P
Bariatric Surgery   Resident History and Physical       Chief Complaint: morbid obesity, preop    History of Present Illness:    Lisette Jay is a 37 y.o. female with Hx of morbid obesity, BMI 42.88, who presents to [facility] for preop EGD in anticipation of weight loss surgery. Past Medical History:        Diagnosis Date    Preoperative clearance     Sleep apnea        Past Surgical History:           Procedure Laterality Date    BACK SURGERY       SECTION      TUBAL LIGATION         Allergies:  Patient has no known allergies. Medications:   Home Meds  No current facility-administered medications on file prior to encounter. Current Outpatient Medications on File Prior to Encounter   Medication Sig Dispense Refill    loratadine (CLARITIN) 10 MG tablet Take 1 tablet by mouth daily 30 tablet 0    ibuprofen (ADVIL;MOTRIN) 600 MG tablet Take 1 tablet by mouth every 6 hours as needed for Pain 30 tablet 0    fluticasone (FLONASE) 50 MCG/ACT nasal spray 2 sprays by Nasal route daily 2 sprays each nostril daily 1 Bottle 0       Current Meds  No current facility-administered medications for this encounter. Family History:   Family History   Problem Relation Age of Onset    Sleep Apnea Maternal Grandmother        Social History:   TOBACCO:   reports that she has never smoked. She has never used smokeless tobacco.  ETOH:   reports current alcohol use. DRUGS:   reports no history of drug use. ROS: A 14 point review of systems was conducted, significant findings as noted in HPI. All other systems negative. Physical exam:    There were no vitals filed for this visit.     General appearance: alert, NAD  Neuro: A&Ox3, no focal deficits  HENT: trachea midline, no JVD, no LAD  Chest/Lungs: normal effort, on room air   Cardiovascular: RRR, no murmurs/gallops/rubs  Abdomen: soft, non-tender, non-distended,no guarding/rigidity  Skin: warm and dry  Extremities: no edema , no cyanosis    Labs:    CBC: No results for input(s): WBC, HGB, HCT, MCV, PLT in the last 72 hours. BMP: No results for input(s): NA, K, CL, CO2, PHOS, BUN, CREATININE in the last 72 hours. Invalid input(s): CA  PT/INR: No results for input(s): PROTIME, INR in the last 72 hours. APTT: No results for input(s): APTT in the last 72 hours. Liver Profile:  Lab Results   Component Value Date    AST 10 10/22/2020    ALT 9 10/22/2020    BILITOT <0.2 10/22/2020    ALKPHOS 91 10/22/2020   No results found for: CHOL, HDL, TRIG  UA:   Lab Results   Component Value Date    COLORU Yellow 07/28/2014    PHUR 6.0 07/28/2014    WBCUA 3-5 07/28/2014    RBCUA 0-2 07/28/2014    MUCUS Rare 07/28/2014    BACTERIA Rare 07/28/2014    CLARITYU Clear 07/28/2014    SPECGRAV >=1.030 07/28/2014    LEUKOCYTESUR Negative 07/28/2014    UROBILINOGEN 0.2 07/28/2014    BILIRUBINUR Negative 07/28/2014    BILIRUBINUR NEGATIVE 06/25/2011    BLOODU TRACE-INTACT 07/28/2014    GLUCOSEU Negative 07/28/2014    GLUCOSEU NEGATIVE 06/25/2011       Imaging:   No orders to display          Assessment/Plan: This is a 37 y.o. female with Hx of morbid obesity, BMI 42.88, presents for preop EGD in anticipation of weight loss surgery. - ok to resume home meds and discharge home following procedure.        Ap Chavez MD  12/14/20  7:46 AM

## 2020-12-14 NOTE — OP NOTE
Esophagogastroduodenoscopy     Indications: Pre-op gastric Surgery, rule out Gastroesophageal reflux disease. Pre-operative Diagnosis: Obesity/pre-op bariatric surgery . Post-operative Diagnosis: Esophagitis, Gastritis, Duodenitis, Hiatal Hernia Hill Grade 1    Surgeon: Ashley Lao    Anesthesia: MAC      Procedure Details   The patient was seen in the Holding Room. The risks, benefits, complications, treatment options, and expected outcomes were discussed with the patient. Pre-op Endoscopy recommended to rule any intragastric pathology that would interfere with proposed procedure /  And or due to current conditions. The possibilities of reaction to medication, pulmonary aspiration, perforation of viscus, bleeding, recurrent infection, the need for additional procedures, failure to diagnose a condition, and creating a complication requiring transfusion or operation were discussed with the patient. The patient concurred with the proposed plan, giving informed consent. The site of surgery properly noted/marked. The patient was taken to Endoscopy Suite, identified as Stan Casey and the procedure verified as  Esophagogastroduodenoscopy  A Time Out was held and the above information confirmed. Upper Endoscopy: An endoscope was inserted through the oropharynx into the upper esophagus. The endoscope was passed into the stomach to the level of the pylorus and passed to the duodenum where the ampulla was visualized and duodenum was normal. Then the scope was retracted back to the stomach and it was normal except for gastritis, biopsy of the antrum obtained for H. Pylori, then retroflexed and the gastroesophageal junction was inspected.  There was a small hiatal hernia and no evidence of Stanford's     Findings:  Esophageal findings include:  Upper: normal Esophageal Mucosa  Lower:normal Esophageal Mucosa  Distal: abnormal Esophageal Mucosa      Gastric findings include: abnormal Gastric Mucosa    Duodenal Findings: abnormal Duodenal Mucosa        Estimated Blood Loss:  none    Biopsy:  Antrum    Complications:  None; patient tolerated the procedure well. Disposition: PACU - hemodynamically stable. Condition: stable    Attending Attestation: I was present and scrubbed for the entire procedure.

## 2020-12-14 NOTE — PROGRESS NOTES
Prescription given for Protonix 20 mg, (2 tabs) daily. Instructions given and patient verbalized understanding of the information.

## 2020-12-14 NOTE — ANESTHESIA PRE PROCEDURE
Department of Anesthesiology  Preprocedure Note       Name:  Tyron Sagastume   Age:  37 y.o.  :  1985                                          MRN:  [id number]         Date:  2020      Surgeon: Rochelle Singletary): Danny Hernandez DO    Procedure: Procedure(s):  ESOPHAGOGASTRODUODENOSCOPY    Medications prior to admission:   Prior to Admission medications    Medication Sig Start Date End Date Taking? Authorizing Provider   loratadine (CLARITIN) 10 MG tablet Take 1 tablet by mouth daily 18  Yes Ryan Pham PA-C   ibuprofen (ADVIL;MOTRIN) 600 MG tablet Take 1 tablet by mouth every 6 hours as needed for Pain 18  Yes Ryan Pham PA-C   fluticasone Burkett Burdock) 50 MCG/ACT nasal spray 2 sprays by Nasal route daily 2 sprays each nostril daily 18  Yes Kaleb Jordan DO       Current medications:    Current Facility-Administered Medications   Medication Dose Route Frequency Provider Last Rate Last Admin    lactated ringers infusion   Intravenous Continuous Kevin Mosqueda  mL/hr at 20 0813 New Bag at 20 0813       Allergies:  No Known Allergies    Problem List:    Patient Active Problem List   Diagnosis Code    Obstructive sleep apnea syndrome G47.33       Past Medical History:        Diagnosis Date    Preoperative clearance     Sleep apnea        Past Surgical History:        Procedure Laterality Date    BACK SURGERY       SECTION      TUBAL LIGATION         Social History:    Social History     Tobacco Use    Smoking status: Never Smoker    Smokeless tobacco: Never Used   Substance Use Topics    Alcohol use:  Yes                                Counseling given: Not Answered      Vital Signs (Current):   Vitals:    20 0804   BP: 114/74   Pulse: 92   Resp: 16   Temp: 97.7 °F (36.5 °C)   TempSrc: Tympanic   SpO2: 99%   Weight: 274 lb (124.3 kg)   Height: 5' 8\" (1.727 m)                                              BP Readings from Last 3 Encounters:   12/14/20 114/74   11/09/20 132/82   11/05/20 105/67       NPO Status: Time of last liquid consumption: 1830                        Time of last solid consumption: 1830                        Date of last liquid consumption: 12/13/20                        Date of last solid food consumption: 12/13/20    BMI:   Wt Readings from Last 3 Encounters:   12/14/20 274 lb (124.3 kg)   11/09/20 282 lb (127.9 kg)   11/05/20 273 lb 6.4 oz (124 kg)     Body mass index is 41.66 kg/m². CBC:   Lab Results   Component Value Date    WBC 9.3 10/22/2020    RBC 4.49 10/22/2020    HGB 11.8 10/22/2020    HCT 35.7 10/22/2020    MCV 79.4 10/22/2020    RDW 15.1 10/22/2020     10/22/2020       CMP:   Lab Results   Component Value Date     10/22/2020    K 4.1 10/22/2020     10/22/2020    CO2 24 10/22/2020    BUN 7 10/22/2020    CREATININE 0.6 10/22/2020    GFRAA >60 10/22/2020    GFRAA >60 06/25/2011    AGRATIO 1.0 10/22/2020    LABGLOM >60 10/22/2020    GLUCOSE 149 10/22/2020    PROT 6.9 10/22/2020    CALCIUM 9.0 10/22/2020    BILITOT <0.2 10/22/2020    ALKPHOS 91 10/22/2020    AST 10 10/22/2020    ALT 9 10/22/2020       POC Tests: No results for input(s): POCGLU, POCNA, POCK, POCCL, POCBUN, POCHEMO, POCHCT in the last 72 hours.     Coags: No results found for: PROTIME, INR, APTT    HCG (If Applicable):   Lab Results   Component Value Date    PREGTESTUR Negative 12/14/2020        ABGs: No results found for: PHART, PO2ART, TJX0FBM, CFE1CSU, BEART, T2XSRZMC     Type & Screen (If Applicable):  No results found for: LABABO, LABRH    Drug/Infectious Status (If Applicable):  No results found for: HIV, HEPCAB    COVID-19 Screening (If Applicable):   Lab Results   Component Value Date    COVID19 NOT DETECTED 12/11/2020         Anesthesia Evaluation  Patient summary reviewed and Nursing notes reviewed  Airway: Mallampati: II  TM distance: >3 FB   Neck ROM: full  Mouth opening: > = 3 FB Dental: normal exam

## 2020-12-16 ENCOUNTER — TELEPHONE (OUTPATIENT)
Dept: BARIATRICS/WEIGHT MGMT | Age: 35
End: 2020-12-16

## 2020-12-16 RX ORDER — AMOXICILLIN 500 MG/1
500 CAPSULE ORAL 2 TIMES DAILY
Qty: 14 CAPSULE | Refills: 0 | Status: SHIPPED | OUTPATIENT
Start: 2020-12-16 | End: 2020-12-23

## 2020-12-16 RX ORDER — LANSOPRAZOLE 30 MG/1
30 CAPSULE, DELAYED RELEASE ORAL DAILY
Qty: 14 CAPSULE | Refills: 0 | Status: SHIPPED | OUTPATIENT
Start: 2020-12-16 | End: 2021-04-08

## 2020-12-16 RX ORDER — CLARITHROMYCIN 500 MG/1
500 TABLET, COATED ORAL 2 TIMES DAILY
Qty: 28 TABLET | Refills: 0 | Status: SHIPPED | OUTPATIENT
Start: 2020-12-16 | End: 2020-12-30

## 2020-12-16 NOTE — TELEPHONE ENCOUNTER
RX sent    [phone] (home) [phone] (work) LVM for return call to inform of results and to go over medications with pt

## 2020-12-17 ENCOUNTER — OFFICE VISIT (OUTPATIENT)
Dept: BARIATRICS/WEIGHT MGMT | Age: 35
End: 2020-12-17
Payer: COMMERCIAL

## 2020-12-17 VITALS
HEART RATE: 88 BPM | WEIGHT: 269.8 LBS | BODY MASS INDEX: 40.89 KG/M2 | DIASTOLIC BLOOD PRESSURE: 78 MMHG | HEIGHT: 68 IN | SYSTOLIC BLOOD PRESSURE: 134 MMHG

## 2020-12-17 PROCEDURE — 1036F TOBACCO NON-USER: CPT | Performed by: SURGERY

## 2020-12-17 PROCEDURE — G8417 CALC BMI ABV UP PARAM F/U: HCPCS | Performed by: SURGERY

## 2020-12-17 PROCEDURE — 99213 OFFICE O/P EST LOW 20 MIN: CPT | Performed by: SURGERY

## 2020-12-17 PROCEDURE — G8427 DOCREV CUR MEDS BY ELIG CLIN: HCPCS | Performed by: SURGERY

## 2020-12-17 PROCEDURE — G8484 FLU IMMUNIZE NO ADMIN: HCPCS | Performed by: SURGERY

## 2020-12-17 RX ORDER — FERROUS SULFATE 325(65) MG
325 TABLET ORAL 2 TIMES DAILY
Qty: 180 TABLET | Refills: 1 | Status: SHIPPED | OUTPATIENT
Start: 2020-12-17 | End: 2021-04-08

## 2020-12-17 NOTE — PROGRESS NOTES
  loratadine (CLARITIN) 10 MG tablet, Take 1 tablet by mouth daily, Disp: 30 tablet, Rfl: 0    ibuprofen (ADVIL;MOTRIN) 600 MG tablet, Take 1 tablet by mouth every 6 hours as needed for Pain, Disp: 30 tablet, Rfl: 0    fluticasone (FLONASE) 50 MCG/ACT nasal spray, 2 sprays by Nasal route daily 2 sprays each nostril daily, Disp: 1 Bottle, Rfl: 0      Review of Systems - History obtained from the patient  General ROS: negative  Psychological ROS: negative  Endocrine ROS: negative  Respiratory ROS: negative  Cardiovascular ROS: negative  Gastrointestinal ROS:negative  Genito-Urinary ROS: negative  Musculoskeletal ROS: negative   Skin ROS: negative    Physical Exam   Vitals Reviewed   Constitutional: Patient is oriented to person, place, and time. Patient appears well-developed and well-nourished. Patient is active and cooperative. Non-toxic appearance. No distress. Neck: Trachea normal and normal range of motion. No JVD present. Pulmonary/Chest: Effort normal. No accessory muscle usage or stridor. No apnea. No respiratory distress. Cardiovascular: Normal rate and no JVD. Abdominal: Normal appearance. Patient exhibits no distension. Abdomen is soft, obese, non tender. Musculoskeletal: Normal range of motion. Patient exhibits no edema. Neurological: Patient is alert and oriented to person, place, and time. Patient has normal strength. GCS eye subscore is 4. GCS verbal subscore is 5. GCS motor subscore is 6. Skin: Skin is warm and dry. No abrasion and no rash noted. Patient is not diaphoretic. No cyanosis or erythema. Psychiatric: Patient has a normal mood and affect.  Speech is normal and behavior is normal. Cognition and memory are normal.       A/P

## 2021-01-21 ENCOUNTER — OFFICE VISIT (OUTPATIENT)
Dept: BARIATRICS/WEIGHT MGMT | Age: 36
End: 2021-01-21
Payer: COMMERCIAL

## 2021-01-21 VITALS
WEIGHT: 276.8 LBS | HEART RATE: 85 BPM | HEIGHT: 68 IN | SYSTOLIC BLOOD PRESSURE: 120 MMHG | DIASTOLIC BLOOD PRESSURE: 89 MMHG | BODY MASS INDEX: 41.95 KG/M2

## 2021-01-21 DIAGNOSIS — G47.33 OBSTRUCTIVE SLEEP APNEA SYNDROME: ICD-10-CM

## 2021-01-21 DIAGNOSIS — E66.01 MORBID OBESITY WITH BMI OF 40.0-44.9, ADULT (HCC): Primary | ICD-10-CM

## 2021-01-21 PROCEDURE — 1036F TOBACCO NON-USER: CPT | Performed by: SURGERY

## 2021-01-21 PROCEDURE — G8427 DOCREV CUR MEDS BY ELIG CLIN: HCPCS | Performed by: SURGERY

## 2021-01-21 PROCEDURE — 99213 OFFICE O/P EST LOW 20 MIN: CPT | Performed by: SURGERY

## 2021-01-21 PROCEDURE — G8484 FLU IMMUNIZE NO ADMIN: HCPCS | Performed by: SURGERY

## 2021-01-21 PROCEDURE — G8417 CALC BMI ABV UP PARAM F/U: HCPCS | Performed by: SURGERY

## 2021-01-21 NOTE — PROGRESS NOTES
[facility]) Physicians   General & Laparoscopic Surgery  [employer] [facility] [employer]       HPI:     Willem Sinclair is a very pleasant 37 y.o. female with Body mass index is 42.09 kg/m². , Pre-Surgery. Pre-operative clearance and work up pending. Working hard to keep good dietary habits as well level of activity. Patient denies any nausea, vomiting, fevers, chills, shortness of breath, chest pain, cough, constipation or difficulty urinating. Past Medical History:   Diagnosis Date    Preoperative clearance     Sleep apnea      Past Surgical History:   Procedure Laterality Date    BACK SURGERY       SECTION      TUBAL LIGATION      UPPER GASTROINTESTINAL ENDOSCOPY N/A 2020    EGD BIOPSY performed by Martina Cuadra DO at [address] ENDOSCOPY     Family History   Problem Relation Age of Onset    Sleep Apnea Maternal Grandmother      Social History     Tobacco Use    Smoking status: Never Smoker    Smokeless tobacco: Never Used   Substance Use Topics    Alcohol use: Yes     I counseled the patient on the importance of not smoking and risks of ETOH. No Known Allergies  Vitals:    21 1241   BP: 120/89   Pulse: 85   Weight: 276 lb 12.8 oz (125.6 kg)   Height: 5' 8\" (1.727 m)       Body mass index is 42.09 kg/m².       Current Outpatient Medications:     ferrous sulfate (IRON 325) 325 (65 Fe) MG tablet, Take 1 tablet by mouth 2 times daily, Disp: 180 tablet, Rfl: 1    loratadine (CLARITIN) 10 MG tablet, Take 1 tablet by mouth daily, Disp: 30 tablet, Rfl: 0    ibuprofen (ADVIL;MOTRIN) 600 MG tablet, Take 1 tablet by mouth every 6 hours as needed for Pain, Disp: 30 tablet, Rfl: 0    fluticasone (FLONASE) 50 MCG/ACT nasal spray, 2 sprays by Nasal route daily 2 sprays each nostril daily, Disp: 1 Bottle, Rfl: 0    lansoprazole (PREVACID) 30 MG delayed release capsule, Take 1 capsule by mouth daily for 14 days, Disp: 14 capsule, Rfl: 0   pantoprazole (PROTONIX) 20 MG tablet, Take 2 tablets by mouth daily for 30 doses, Disp: 60 tablet, Rfl: 2      Review of Systems - History obtained from the patient  General ROS: negative  Psychological ROS: negative  Endocrine ROS: negative  Respiratory ROS: negative  Cardiovascular ROS: negative  Gastrointestinal ROS:negative  Genito-Urinary ROS: negative  Musculoskeletal ROS: negative   Skin ROS: negative    Physical Exam   Vitals Reviewed   Constitutional: Patient is oriented to person, place, and time. Patient appears well-developed and well-nourished. Patient is active and cooperative. Non-toxic appearance. No distress. Neck: Trachea normal and normal range of motion. No JVD present. Pulmonary/Chest: Effort normal. No accessory muscle usage or stridor. No apnea. No respiratory distress. Cardiovascular: Normal rate and no JVD. Abdominal: Normal appearance. Patient exhibits no distension. Abdomen is soft, obese, non tender. Musculoskeletal: Normal range of motion. Patient exhibits no edema. Neurological: Patient is alert and oriented to person, place, and time. Patient has normal strength. GCS eye subscore is 4. GCS verbal subscore is 5. GCS motor subscore is 6. Skin: Skin is warm and dry. No abrasion and no rash noted. Patient is not diaphoretic. No cyanosis or erythema. Psychiatric: Patient has a normal mood and affect. Speech is normal and behavior is normal. Cognition and memory are normal.       A/P    Gurvinder Burnette is 37 y.o. female, Body mass index is 42.09 kg/m². pre surgery, has gained 7# since last visit. The patient underwent dietary counseling with registered dietician. I have reviewed, discussed and agree with the dietary plan. Patient is trying hard to keep good dietary and behavior modifications. Patient is monitoring portion sizes, food choices and liquid calories. Patient is trying to exercise regularly as much as possible. We discussed how her weight affects her overall health including:  Arun Cooper was seen today for obesity. Diagnoses and all orders for this visit:    Morbid obesity with BMI of 40.0-44.9, adult ([address]    Obstructive sleep apnea syndrome       and importance of weight loss to alleviate those co morbid conditions. I encouraged the patient to continue exercise and keeping healthy eating habits. Discussed pre-op labs and work up till now. Also counseled the patient extensively on Surgery. Total encounter time: 20 minutes including any number of the following: review of labs, imaging, provider notes, outside hospital records; performing examination/evaluation; counseling patient and family; ordering medications/tests; placing referrals and communication with referring physicians; coordination of care, and documentation in the EHR. RTC in 4 weeks  Obtain rest of pre-op work up / clearances  Diet and Exercise      Patient advised that its their responsibility to follow up for studies and/or labs ordered today.      Herber John

## 2021-01-21 NOTE — PROGRESS NOTES
Jaimie Tanner gained 7 lbs over past ~ month. Pt feels like some is r/t fluid retention, states her weight has always fluctuated. Is pt eating at least 4 times everyday? yes - 4-5x/day    B- HB egg OR oatmeal OR mini plain bagel  S- sometimes- crackers OR goldfish  L- 1/2 turkey sandwich sometimes a few grapes OR crackers OR fruit  S- none  D- steak OR fish w/ broccoli OR brussels sprouts  S- salad w/ fruit  S- fruit    Is pt eating a lean protein source with all meals and snacks? inconsistent    Has pt decreased their portions using the plate method? yes - using a smaller plate    Is pt choosing low fat/sugar free options? yes    Is pt drinking at least 64 oz of clear liquids everyday? yes - water     Has pt stopped drinking carbonation, caffeinated, and sugar sweetened beverages? yes / eliminated wine     Has pt sampled Unjury and/or Nectar protein?  yes - tried & tolerated    Participating in intentional exercise? yes - walking ~15-20 min per day plus water aerobics 2x/wk    Plan/Recommendations:   - Focus on lean protein 4x/day  - Limit fruit to 1/2 cup, 2-3x/day (watch portions)  - Complete Support Group    Handouts: signed up for SG 2/12/21    Darrick Valdez

## 2021-01-21 NOTE — PATIENT INSTRUCTIONS
Patient received dietary handouts and education.     Plan/Recommendations:   - Focus on lean protein 4x/day  - Limit fruit to 1/2 cup, 2-3x/day (watch portions)  - Complete Support Group

## 2021-01-26 ENCOUNTER — INITIAL CONSULT (OUTPATIENT)
Dept: BARIATRICS/WEIGHT MGMT | Age: 36
End: 2021-01-26
Payer: COMMERCIAL

## 2021-01-26 DIAGNOSIS — F43.22 ADJUSTMENT REACTION WITH ANXIETY: Primary | ICD-10-CM

## 2021-01-26 DIAGNOSIS — E66.01 MORBID OBESITY WITH BMI OF 40.0-44.9, ADULT (HCC): ICD-10-CM

## 2021-01-26 PROCEDURE — 96137 PSYCL/NRPSYC TST PHY/QHP EA: CPT | Performed by: PSYCHOLOGIST

## 2021-01-26 PROCEDURE — 96136 PSYCL/NRPSYC TST PHY/QHP 1ST: CPT | Performed by: PSYCHOLOGIST

## 2021-01-26 PROCEDURE — 96130 PSYCL TST EVAL PHYS/QHP 1ST: CPT | Performed by: PSYCHOLOGIST

## 2021-01-26 PROCEDURE — 99999 PR OFFICE/OUTPT VISIT,PROCEDURE ONLY: CPT | Performed by: PSYCHOLOGIST

## 2021-01-26 PROCEDURE — 90791 PSYCH DIAGNOSTIC EVALUATION: CPT | Performed by: PSYCHOLOGIST

## 2021-01-26 NOTE — PROGRESS NOTES
Martha Cárdenas presented for her presurgical psychological evaluation on 01/26/2021. The evaluation consisted of a clinical interview, the Eating Habits Checklist, the Binge Eating Disorder Screener - 7 (BEDS-7), and the [address] (MBMD) administered by the provider. Based on the evaluation, Martha Cárdenas is considered to be an appropriate candidate for bariatric surgery from a psychological standpoint. She exhibits mild anxiety secondary to her health and impending surgery, but otherwise reports no significant history of mental health concerns. She reports no history of psychological intervention, including psychotropic medication. She denies a history of suicidal ideation or suicide attempts, and has never been hospitalized psychiatrically. She denies a history of chemical abuse or dependence. She was formerly a light smoker of 1-3 cigarettes per day for the past several years, but states she quit completely in December 2020. She also discontinued alcohol in December, after having been an occasional, social drinker. She denies any other recreational or illicit drug use. She denies a history of trauma. Martha Cárdenas has never been diagnosed with an eating disorder, and her responses in the interview and on the Eating Habits Checklist and the BEDS-7 do not warrant a clinical diagnosis. She reports eating 4-5 small meals and/or snacks consistently throughout her day. She has eliminated caffeine and carbonated beverages from her diet. She reports drinking an adequate daily intake of water. She denies binge eating or purging behavior. Martha Cárdenas maintains a high level of functional activity working as a  for [employer], with whom she has been employed for two years. She currently resides with her three children, ages 15, 6 and 6years old. She notes she has been in a relationship for 15 years. Puja Sosa completed the MBMD as part of the evaluation. Her profile is valid. Results indicate no significant negative health habits at this time. There is no indication of acute psychiatric distress, including anxiety, depression, emotional lability, or cognitive dysfunction. Her profile indicates she is generally psychologically well-adjusted and tends to assume life will go well for her. As a medical patient, her initial response to illness is likely to be denial, followed by annoyance or flippancy to mask the growing anxiety beneath her calm veneer. While she is inclined to be friendly and cooperative, she may find the rules and repetitiveness of a long-term treatment regimen overly boring or demanding. The numerous coping assets reflected in her profile include: appropriate concern for her health, functional competence, pain tolerance, social support, future optimism, spiritual tammy, the ability to tolerate the physical and/or psychological discomfort associated with medical procedures, medication conscientiousness, appropriate utilization of healthcare resources, optimal compliance, and openness to receiving feedback and/or discussing matters pertaining to her health. Roshni Colón exhibited good awareness of the risks of bariatric surgery; however, she believes the benefits will outweigh the potential risks, as she hopes to minimize or reverse the effects of her sleep apnea, and avoid the development of additional weight-related medical concerns. She reports realistic expectations for the procedure, as her overall goals include improved health, increased activity with her children, and a goal weight of 180-200 lbs. She understands the need for permanent lifestyle change, including dietary modifications and a regular exercise program, and expressed confidence in her ability to implement the necessary changes. She expressed a commitment to comply with treatment recommendations through this office. She identified her boyfriend, her grandparents, and her friends as a good support system in her efforts to meet her weight management goals. In summary, Roshni Colón is considered to be an appropriate candidate for bariatric surgery from a psychological standpoint. She was encouraged to participate in support group activities through [facility], and to consult with our staff should she experience any significant post-surgical mood changes or have difficulty modifying her eating behaviors. Feel free to consult with me as needed with any further questions regarding this evaluation. Patient spent 60 minutes completing the psychological testing. Provider spent 50 minutes in test evaluation services on 01/26/2021, and an additional 15 minutes on 02/02/2021.

## 2021-02-18 ENCOUNTER — TELEMEDICINE (OUTPATIENT)
Dept: BARIATRICS/WEIGHT MGMT | Age: 36
End: 2021-02-18
Payer: COMMERCIAL

## 2021-02-18 DIAGNOSIS — E66.01 MORBID OBESITY WITH BMI OF 40.0-44.9, ADULT (HCC): Primary | ICD-10-CM

## 2021-02-18 DIAGNOSIS — G47.33 OBSTRUCTIVE SLEEP APNEA SYNDROME: ICD-10-CM

## 2021-02-18 PROCEDURE — 99213 OFFICE O/P EST LOW 20 MIN: CPT | Performed by: SURGERY

## 2021-02-21 NOTE — PROGRESS NOTES
[facility] ([facility]) Physicians   [employer]         TELEHEALTH EVALUATION -- Audio/Visual (During EXIWD-35 public health emergency)           Chief Complaint   Patient presents with    Weight Management     5th       HPI:       Due to the COVID-19 pandemic restrictions on close contact interactions as recommended by CDC and health authorities, the patient's visit was conducted via telemedicine in lieu of a face to face visit. Patient verbally consented and agreed to proceed. The patient is here through telemedicine for their bariatric surgery presurgical visit for future weight loss. Dave Bryan is a very pleasant 37 y.o. female with Chronic Obesity. Patient weight is 276 lbs, BMI is 42 kg/m². Yelitza Segura has been struggling for several years now with obesity. Yelitza Segura feels the weight is an obstacle to achieve and perform things in daily living as well risk on health. Patient  is very determined to lose weight and be healthy, and is working towards  surgical weight loss to achieve this goal. Pre-operative clearance and work up pending. Working hard to keep good dietary habits as well level of activity. Patient denies any nausea, vomiting, fevers, chills, shortness of breath, chest pain, cough, constipation or difficulty urinating. Past Medical History:   Diagnosis Date    Preoperative clearance     Sleep apnea      Past Surgical History:   Procedure Laterality Date    BACK SURGERY       SECTION      TUBAL LIGATION      UPPER GASTROINTESTINAL ENDOSCOPY N/A 2020    EGD BIOPSY performed by Isabel Jacobs DO at [facility] END[facility]COPY     Family History   Problem Relation Age of Onset    Sleep Apnea Maternal Grandmother      Social History     Tobacco Use    Smoking status: Never Smoker    Smokeless tobacco: Never Used   Substance Use Topics    Alcohol use: Yes     I counseled the patient on the importance of not smoking and risks of ETOH.    No Known Allergies   loratadine (CLARITIN) 10 MG tablet, Take 1 tablet by mouth daily, Disp: 30 tablet, Rfl: 0    ibuprofen (ADVIL;MOTRIN) 600 MG tablet, Take 1 tablet by mouth every 6 hours as needed for Pain, Disp: 30 tablet, Rfl: 0    fluticasone (FLONASE) 50 MCG/ACT nasal spray, 2 sprays by Nasal route daily 2 sprays each nostril daily, Disp: 1 Bottle, Rfl: 0    Review of Systems - History obtained from the patient  General ROS: negative  Psychological ROS: negative  Ophthalmic ROS: negative  Neurological ROS: negative  ENT ROS: negative  Allergy and Immunology ROS: negative  Hematological and Lymphatic ROS: negative  Endocrine ROS: negative  Breast ROS: negative  Respiratory ROS: negative  Cardiovascular ROS: negative  Gastrointestinal ROS:negative  Genito-Urinary ROS: negative  Musculoskeletal ROS: negative   Skin ROS: negative       Physical Exam  Deferred due to pandemic       A/P    Cheryl Patricio is 37 y.o. female, pre surgery, has stayed weight stable since last visit. The patient underwent dietary counseling with myself / or registered dietitian. I have reviewed, discussed and agree with the dietary plan. Patient is trying hard to keep good dietary and behavior modifications. Patient is monitoring portion sizes, food choices and liquid calories. Patient is trying to exercise regularly as much as possible. We discussed how her weight affects her overall health including:  Patient Active Problem List   Diagnosis    Obstructive sleep apnea syndrome    and importance of weight loss to alleviate those co morbid conditions. I encouraged the patient to continue exercise and keeping healthy eating habits. Discussed pre-op labs and work up till now. Also counseled the patient extensively on Surgery. Obesity as a disease is considered a high risk to patients overall health and should therefore be considered a high risk disease state. Total encounter time: 20 minutes, including any number of the following: Bariatric Pre/Post operative work up/protocols, review of labs, imaging, provider notes, outside hospital records, performing examination/evaluation, counseling patient and/or family, ordering medications/tests, placing referrals and communication with referring physicians, coordination of care; discussing dietary plan/recall with the patient as well with registered dietitian and documentation in the EHR. Of note, the above was done during same day of the actual patient encounter. RTC in 4 weeks  Obtain rest of pre-op work up / clearances  Healthy Diet and Exercise      Patient advised that its their responsibility to follow up for their care, studies, referrals and/or labs ordered today. Pursuant to the emergency declaration under the [facility]5 waiver authority and the [employer] Act, this Virtual Visit was conducted, with patient's consent, to reduce the patient's risk of exposure to COVID-19 and provide continuity of care for an established patient. Services were provided through a video synchronous discussion virtually to substitute for in-person clinic visit. Please note that some or all of this report was generated using voice recognition software. Please notify me in case of any questions about the content of this document, as some errors in transcription may have occurred .

## 2021-02-25 PROBLEM — E66.01 MORBID OBESITY WITH BMI OF 40.0-44.9, ADULT (HCC): Status: ACTIVE | Noted: 2021-02-25

## 2021-02-25 PROBLEM — R73.03 PREDIABETES: Status: ACTIVE | Noted: 2021-02-25

## 2021-02-25 PROBLEM — K21.9 CHRONIC GERD: Status: ACTIVE | Noted: 2021-02-25

## 2021-02-25 ASSESSMENT — ENCOUNTER SYMPTOMS
ALLERGIC/IMMUNOLOGIC NEGATIVE: 1
GASTROINTESTINAL NEGATIVE: 1
COUGH: 0
SHORTNESS OF BREATH: 0
EYES NEGATIVE: 1
RESPIRATORY NEGATIVE: 1

## 2021-02-25 NOTE — PROGRESS NOTES
hours as needed for Pain, Disp: 30 tablet, Rfl: 0    fluticasone (FLONASE) 50 MCG/ACT nasal spray, 2 sprays by Nasal route daily 2 sprays each nostril daily, Disp: 1 Bottle, Rfl: 0    Lab Results   Component Value Date    WBC 9.3 10/22/2020    RBC 4.49 10/22/2020    HGB 11.8 10/22/2020    HCT 35.7 10/22/2020    MCV 79.4 10/22/2020    MCH 26.2 10/22/2020    MCHC 33.0 10/22/2020    MPV 6.9 10/22/2020    NEUTOPHILPCT 61.1 10/22/2020    LYMPHOPCT 30.5 10/22/2020    MONOPCT 5.5 10/22/2020    EOSRELPCT 2.3 10/22/2020    BASOPCT 0.6 10/22/2020    NEUTROABS 5.7 10/22/2020    LYMPHSABS 2.9 10/22/2020    MONOSABS 0.5 10/22/2020    EOSABS 0.2 10/22/2020     Lab Results   Component Value Date     10/22/2020    K 4.1 10/22/2020     10/22/2020    CO2 24 10/22/2020    ANIONGAP 11 10/22/2020    GLUCOSE 149 10/22/2020    BUN 7 10/22/2020    CREATININE 0.6 10/22/2020    LABGLOM >60 10/22/2020    GFRAA >60 10/22/2020    GFRAA >60 06/25/2011    CALCIUM 9.0 10/22/2020    PROT 6.9 10/22/2020    LABALBU 3.5 10/22/2020    AGRATIO 1.0 10/22/2020    BILITOT <0.2 10/22/2020    ALKPHOS 91 10/22/2020    ALT 9 10/22/2020    AST 10 10/22/2020    GLOB 3.4 10/22/2020     No results found for: CHOL, TRIG, HDL, LDLCALC, LABVLDL  Lab Results   Component Value Date    TSHREFLEX 0.68 10/22/2020     Lab Results   Component Value Date    IRON 35 10/22/2020    TIBC 356 10/22/2020    LABIRON 10 10/22/2020     Lab Results   Component Value Date    JNHIHEMM81 618 10/22/2020    FOLATE 9.55 10/22/2020     Lab Results   Component Value Date    VITD25 22.1 10/22/2020     Lab Results   Component Value Date    LABA1C 5.9 10/22/2020    .6 10/22/2020       Review of Systems   Constitutional: Negative. Negative for chills, fatigue and fever. HENT: Negative. Eyes: Negative. Respiratory: Negative. Negative for cough and shortness of breath. Cardiovascular: Negative. Gastrointestinal: Negative. Endocrine: Negative.     Genitourinary: Negative. Musculoskeletal: Negative. Skin: Negative. Allergic/Immunologic: Negative. Neurological: Negative. Hematological: Negative. Psychiatric/Behavioral: Negative. Objective:     Physical Exam  Vitals signs reviewed. Constitutional:       Appearance: She is well-developed. HENT:      Head: Normocephalic and atraumatic. Eyes:      Conjunctiva/sclera: Conjunctivae normal.      Pupils: Pupils are equal, round, and reactive to light. Neck:      Musculoskeletal: Normal range of motion and neck supple. Pulmonary:      Effort: Pulmonary effort is normal.   Abdominal:      Palpations: Abdomen is soft. Musculoskeletal: Normal range of motion. Skin:     General: Skin is warm and dry. Neurological:      Mental Status: She is alert and oriented to person, place, and time. Psychiatric:         Behavior: Behavior normal.         Thought Content: Thought content normal.         Judgment: Judgment normal.       Assessment and Plan:   Patient is here for their preoperative education group visit for sleeve gastrectomy. The patient is up 5.6 lbs today, but with a net loss of 6 lbs. The patient's current Body mass index is 42.12 kg/m². (3/11/21). She is making good dietary and behavior modifications and is considered to be a good surgical candidate. Patient has a diagnosis of prediabetes. Reviewed the importance of complying with post op diet. Patient has a diagnosis of obstructive sleep apnea and uses a CPAP for sleep. Discussed that weight loss can assist with improving sleep apnea symptoms. Explained to patient to make sure they bring their CPAP with them to the hospital for their surgery. Patient has a diagnosis of chronic GERD and takes a PPI. Discussed the benefits of weight loss and dietary changes on acid reflux. However, they will most likely need to continue their medication short term after surgery as they adjust to the new diet.  Discussed the fact that they will be required to crush or open their medications for the first two weeks after surgery and reviewed those medications that can not be crushed. Patient received instruction that it is recommended to avoid pregnancy following bariatric surgery for at least 2 years to allow them to have stable weight loss and to help avoid increased risk of vitamin deficiencies and malnutrition. The patient was encouraged to discuss possible contraceptive methods with their PCP or OBGYN. Patient received instructions from the registered dietitian in reference to the two week preoperative diet and the four phases of their postoperative diet. In addition I reviewed these instructions and stressed the importance of following these recommendations for their safety. Patient completed the preoperative class where they were provided with education related to their bariatric surgery, common surgical complications, medications preoperatively & postoperatively, special concerns related to bariatric surgery postoperatively, vitamin supplementation, patient agreement, PAT & scheduling, hospital course, wellness discovery program and what to do in the case of an emergency postoperatively. The dietitians reviewed all preoperative and postoperative diet instructions. Patient was given the opportunity to ask questions during the group visit and these questions were answered by myself and/or the dietitian. I spent a total of 40 minutes on the day of the visit and over half of that time was spent counseling the patient on proper dietary behaviors, exercise and surgery protocols.

## 2021-03-03 NOTE — PROGRESS NOTES
Patient Name:   Jackie Hidalgo      Type of Surgery:  Sleeve         Date of Surgery:  4/13/21       Start Pre-Op Diet On:  3/31/21       Start Clear Liquids On:  4/12/21       If you are having a gastric bypass, start Bowel Prep between 2-4pm the day prior to surgery. NPO after midnight the night before your surgery! Take morning medications with a small amount of water.     NOTES:_______________________________________  ______________________________________________

## 2021-03-04 ENCOUNTER — OFFICE VISIT (OUTPATIENT)
Dept: BARIATRICS/WEIGHT MGMT | Age: 36
End: 2021-03-04
Payer: COMMERCIAL

## 2021-03-04 VITALS
SYSTOLIC BLOOD PRESSURE: 121 MMHG | DIASTOLIC BLOOD PRESSURE: 79 MMHG | WEIGHT: 271.4 LBS | HEIGHT: 68 IN | BODY MASS INDEX: 41.13 KG/M2 | HEART RATE: 79 BPM

## 2021-03-04 DIAGNOSIS — G47.33 OBSTRUCTIVE SLEEP APNEA SYNDROME: ICD-10-CM

## 2021-03-04 DIAGNOSIS — E66.01 MORBID OBESITY WITH BMI OF 40.0-44.9, ADULT (HCC): Primary | ICD-10-CM

## 2021-03-04 PROCEDURE — G8417 CALC BMI ABV UP PARAM F/U: HCPCS | Performed by: SURGERY

## 2021-03-04 PROCEDURE — 1036F TOBACCO NON-USER: CPT | Performed by: SURGERY

## 2021-03-04 PROCEDURE — G8427 DOCREV CUR MEDS BY ELIG CLIN: HCPCS | Performed by: SURGERY

## 2021-03-04 PROCEDURE — G8484 FLU IMMUNIZE NO ADMIN: HCPCS | Performed by: SURGERY

## 2021-03-04 PROCEDURE — 99214 OFFICE O/P EST MOD 30 MIN: CPT | Performed by: SURGERY

## 2021-03-04 NOTE — PROGRESS NOTES
Martha Cárdenas lost 5.4 lbs over past ~2-3 weeks. Pt journals intake. Is pt eating at least 4 times everyday? yes     B- HB egg w/ mini bagel & PB OR oatmeal  S- apple w/ PB OR protein shake  L- 1/2 turkey sandwich w/ fruit  S- whole wheat crackers w/ crab salad  D- salmon OR chili OR turkey burger w/ asparagus or green beans  S- PB crackers  S- grapes OR strawberry    Is pt eating a lean protein source with all meals and snacks? yes    Has pt decreased their portions using the plate method? yes - using preportioned containers / smaller plate    Is pt choosing low fat/sugar free options? yes    Is pt drinking at least 64 oz of clear liquids everyday? yes - water     Has pt stopped drinking carbonation, caffeinated, and sugar sweetened beverages? yes    Has pt sampled Unjury and/or Nectar protein?  yes - tried & tolerated    Participating in intentional exercise? yes - swimming 1x/wk plus on her feet all day at work    Plan/Recommendations:   - Continue plan    Handouts: none    Lexie Cooper

## 2021-03-11 ENCOUNTER — OFFICE VISIT (OUTPATIENT)
Dept: BARIATRICS/WEIGHT MGMT | Age: 36
End: 2021-03-11
Payer: COMMERCIAL

## 2021-03-11 VITALS — WEIGHT: 277 LBS | HEIGHT: 68 IN | BODY MASS INDEX: 41.98 KG/M2

## 2021-03-11 DIAGNOSIS — G47.33 OBSTRUCTIVE SLEEP APNEA SYNDROME: Primary | ICD-10-CM

## 2021-03-11 DIAGNOSIS — R73.03 PREDIABETES: ICD-10-CM

## 2021-03-11 DIAGNOSIS — E66.01 MORBID OBESITY WITH BMI OF 40.0-44.9, ADULT (HCC): ICD-10-CM

## 2021-03-11 DIAGNOSIS — K21.9 CHRONIC GERD: ICD-10-CM

## 2021-03-11 PROCEDURE — G8484 FLU IMMUNIZE NO ADMIN: HCPCS | Performed by: NURSE PRACTITIONER

## 2021-03-11 PROCEDURE — G8417 CALC BMI ABV UP PARAM F/U: HCPCS | Performed by: NURSE PRACTITIONER

## 2021-03-11 PROCEDURE — 1036F TOBACCO NON-USER: CPT | Performed by: NURSE PRACTITIONER

## 2021-03-11 PROCEDURE — 99214 OFFICE O/P EST MOD 30 MIN: CPT | Performed by: NURSE PRACTITIONER

## 2021-03-11 PROCEDURE — G8427 DOCREV CUR MEDS BY ELIG CLIN: HCPCS | Performed by: NURSE PRACTITIONER

## 2021-03-14 NOTE — PROGRESS NOTES
[facility]) Physicians   General & Laparoscopic Surgery  [employer]       HPI:     Queenie Rodas is a very pleasant 37 y.o. female with Body mass index is 41.27 kg/m². , Pre-Surgery. Pre-operative clearance and work up pending. Working hard to keep good dietary habits as well level of activity. Patient denies any nausea, vomiting, fevers, chills, shortness of breath, chest pain, cough, constipation or difficulty urinating. Past Medical History:   Diagnosis Date    Preoperative clearance     Sleep apnea      Past Surgical History:   Procedure Laterality Date    BACK SURGERY       SECTION      TUBAL LIGATION      UPPER GASTROINTESTINAL ENDOSCOPY N/A 2020    EGD BIOPSY performed by Migel Kelly DO at [facility] ENDOSCOPY     Family History   Problem Relation Age of Onset    Sleep Apnea Maternal Grandmother      Social History     Tobacco Use    Smoking status: Never Smoker    Smokeless tobacco: Never Used   Substance Use Topics    Alcohol use: Yes     I counseled the patient on the importance of not smoking and risks of ETOH. No Known Allergies  Vitals:    21 1408   BP: 121/79   Pulse: 79   Weight: 271 lb 6.4 oz (123.1 kg)   Height: 5' 8\" (1.727 m)       Body mass index is 41.27 kg/m².       Current Outpatient Medications:     ferrous sulfate (IRON 325) 325 (65 Fe) MG tablet, Take 1 tablet by mouth 2 times daily, Disp: 180 tablet, Rfl: 1    loratadine (CLARITIN) 10 MG tablet, Take 1 tablet by mouth daily, Disp: 30 tablet, Rfl: 0    ibuprofen (ADVIL;MOTRIN) 600 MG tablet, Take 1 tablet by mouth every 6 hours as needed for Pain, Disp: 30 tablet, Rfl: 0    fluticasone (FLONASE) 50 MCG/ACT nasal spray, 2 sprays by Nasal route daily 2 sprays each nostril daily, Disp: 1 Bottle, Rfl: 0    lansoprazole (PREVACID) 30 MG delayed release capsule, Take 1 capsule by mouth daily for 14 days, Disp: 14 capsule, Rfl: 0    pantoprazole (PROTONIX) 20 MG tablet, Take 2 tablets by mouth daily for 30 doses, Disp: 60 tablet, Rfl: 2      Review of Systems - History obtained from the patient  General ROS: negative  Psychological ROS: negative  Endocrine ROS: negative  Respiratory ROS: negative  Cardiovascular ROS: negative  Gastrointestinal ROS:negative  Genito-Urinary ROS: negative  Musculoskeletal ROS: negative   Skin ROS: negative    Physical Exam   Vitals Reviewed   Constitutional: Patient is oriented to person, place, and time. Patient appears well-developed and well-nourished. Patient is active and cooperative. Non-toxic appearance. No distress. Neck: Trachea normal and normal range of motion. No JVD present. Pulmonary/Chest: Effort normal. No accessory muscle usage or stridor. No apnea. No respiratory distress. Cardiovascular: Normal rate and no JVD. Abdominal: Normal appearance. Patient exhibits no distension. Abdomen is soft, obese, non tender. Musculoskeletal: Normal range of motion. Patient exhibits no edema. Neurological: Patient is alert and oriented to person, place, and time. Patient has normal strength. GCS eye subscore is 4. GCS verbal subscore is 5. GCS motor subscore is 6. Skin: Skin is warm and dry. No abrasion and no rash noted. Patient is not diaphoretic. No cyanosis or erythema. Psychiatric: Patient has a normal mood and affect. Speech is normal and behavior is normal. Cognition and memory are normal.       A/P    Michael Nichols is 37 y.o. female, Body mass index is 41.27 kg/m². pre surgery, has lost 5# since last visit. The patient underwent dietary counseling with registered dietician. I have reviewed, discussed and agree with the dietary plan. Patient is trying hard to keep good dietary and behavior modifications. Patient is monitoring portion sizes, food choices and liquid calories. Patient is trying to exercise regularly as much as possible.   We discussed how her weight affects her overall health including:  Ted Davide was seen today for weight understands if BMI is lower than 40 without significant co morbid conditions, concerns for risks of surgery being somewhat higher over long run, however patient wants to proceed and fully understands the risks. Clearly BMI over 35 does impose very serious health risks as well chances of losing weight on diet only is very limited and sustaining weight loss is even less, thus surgery is certainly recommended for long term weight loss and better health overall given compliance. Obesity as a disease is considered a high risk to patients overall health and should therefore be considered a high risk disease state. Now with Covid-19 pandemic, CDC and health authorities does classify obese patients as vulnerable and high risk as well. Which makes weight loss a priority for improvement of their wellbeing and overall health. I advised the patient that we can't guarantee final insurance approval.        I advised the patient that sometimes other indicated procedures may arise and the decision will be based on my assessment intraoperatively. Some may include include but not limited to:  [Ventral Hernia, Risks include but not limited to; infection, bleeding, injury to organs or nerves or vessels, PE, DVT, cardiac events, , persistent pain or symptoms, abscess, hernia recurrence or need for further procedures. However that will be determined intra-operatively, if not safe to proceed , then any additional procedures will have to be addressed later as primary goal is bariatric surgery.]      [ Hiatal Hernia, will attempt repair,  risks and benefits including but not limited to; hemothorax, pneumothorax, recurrence, difficulty swallowing, persistent symptoms, reflux and need for medications, esophageal, splenic, lung, heart, bowel, vagus nerve or gastric injuries.  However that will be determined intra-operatively, if not safe to proceed, then any additional procedures will have to be addressed later as primary goal is

## 2021-03-18 ENCOUNTER — TELEPHONE (OUTPATIENT)
Dept: BARIATRICS/WEIGHT MGMT | Age: 36
End: 2021-03-18

## 2021-03-18 NOTE — TELEPHONE ENCOUNTER
Spoke with pt to remind her to complete her pre surg labwork so her Raghav Aguirre clinicals can be submitted for prior auth for surgery, DOS 4/13/21.     Pt stated she would go on 3/20/21

## 2021-03-23 ENCOUNTER — HOSPITAL ENCOUNTER (OUTPATIENT)
Age: 36
Discharge: HOME | End: 2021-03-23
Payer: COMMERCIAL

## 2021-03-23 DIAGNOSIS — E66.01 MORBID OBESITY WITH BMI OF 40.0-44.9, ADULT (HCC): ICD-10-CM

## 2021-03-23 LAB
AMPHETAMINE SCREEN, URINE: NORMAL
BARBITURATE SCREEN URINE: NORMAL
BENZODIAZEPINE SCREEN, URINE: NORMAL
CANNABINOID SCREEN URINE: NORMAL
COCAINE METABOLITE SCREEN URINE: NORMAL
ETHANOL: NORMAL MG/DL (ref 0–0.08)
HCG(URINE) PREGNANCY TEST: NEGATIVE
Lab: NORMAL
METHADONE SCREEN, URINE: NORMAL
OPIATE SCREEN URINE: NORMAL
OXYCODONE URINE: NORMAL
PH UA: 6
PHENCYCLIDINE SCREEN URINE: NORMAL
PROPOXYPHENE SCREEN: NORMAL

## 2021-03-23 PROCEDURE — 80307 DRUG TEST PRSMV CHEM ANLYZR: CPT

## 2021-03-23 PROCEDURE — G0480 DRUG TEST DEF 1-7 CLASSES: HCPCS

## 2021-03-23 PROCEDURE — 36415 COLL VENOUS BLD VENIPUNCTURE: CPT

## 2021-03-23 PROCEDURE — 84703 CHORIONIC GONADOTROPIN ASSAY: CPT

## 2021-03-23 PROCEDURE — 82077 ASSAY SPEC XCP UR&BREATH IA: CPT

## 2021-03-26 LAB
3-OH-COTININE: <2 NG/ML
COTININE: <2 NG/ML
NICOTINE: <2 NG/ML

## 2021-03-29 ENCOUNTER — TELEPHONE (OUTPATIENT)
Dept: BARIATRICS/WEIGHT MGMT | Age: 36
End: 2021-03-29

## 2021-03-29 NOTE — TELEPHONE ENCOUNTER
Reference #:  [id number]   Description:  Inpatient Elective   Place Of Service:  [address] Provider: Deidra Parker/Osteopath () QUANG   Requesting/Ordering Provider:   Deidra Parker/Shukri () QUANG   Servicing/Rendering Provider:    Facility:  [facility], [address] [facility]   Member Information   Member Name:  Christiano Abreu Id:  [id number]     YOB: 1985     Gender:  Female     Admission Event   Diagnosis Code:  E66.01 Morbid (severe) obesity due to excess calories; K21.9 Gastro-esophageal reflux disease without esophagitis; G47.33 Obstructive sleep apnea (adult) (pediatric); K44.9 Diaphragmatic hernia without obstruction or gangrene   Procedure:  59247 Laparoscopy, surgical, gastric restrictive procedure; longitudinal gastrectomy (ie, sleeve gastrectomy); 19270 Laparoscopy, surgical, repair of paraesophageal hernia, includes fundoplasty, when performed; without implantation of mesh   Line #1   Requested Received Date:  3/29/2021 7:04:00 PM Requested Days:  1   Start Date of Service:  4/13/2021 Authorized Days:  0   End Date of Service:  4/14/2021 Status:  Pending

## 2021-04-07 NOTE — TELEPHONE ENCOUNTER
[facility] approval sleeve [id number]  4/13/21-4/14/21  DOS 4/13/21  [facility] #-[id number]

## 2021-04-08 ENCOUNTER — TELEPHONE (OUTPATIENT)
Dept: BARIATRICS/WEIGHT MGMT | Age: 36
End: 2021-04-08

## 2021-04-08 ENCOUNTER — OFFICE VISIT (OUTPATIENT)
Dept: PRIMARY CARE CLINIC | Age: 36
End: 2021-04-08
Payer: COMMERCIAL

## 2021-04-08 DIAGNOSIS — Z01.818 PREOP EXAMINATION: Primary | ICD-10-CM

## 2021-04-08 PROCEDURE — G8428 CUR MEDS NOT DOCUMENT: HCPCS | Performed by: NURSE PRACTITIONER

## 2021-04-08 PROCEDURE — 99211 OFF/OP EST MAY X REQ PHY/QHP: CPT | Performed by: NURSE PRACTITIONER

## 2021-04-08 PROCEDURE — G8417 CALC BMI ABV UP PARAM F/U: HCPCS | Performed by: NURSE PRACTITIONER

## 2021-04-08 RX ORDER — LORATADINE 10 MG/1
10 CAPSULE, LIQUID FILLED ORAL DAILY
COMMUNITY

## 2021-04-08 NOTE — TELEPHONE ENCOUNTER
LM for patient regarding her pre surg Covid testing needed. Pt needs to go today by 3 or be at [facility] at 8 am tomorrow, or surgery may be cancelled.

## 2021-04-08 NOTE — PROGRESS NOTES
that are not secured in the hospital safe. However, if your insurance requires a co-pay, you may want to bring a method of payment, i.e. Check or credit card, if you wish to pay your co-pay the day of surgery. 12. If you are to stay overnight, you may bring a bag with personal items. Please have any large items you may need brought in by your family after your arrival to your hospital room. 15. If you have a Living Will or Durable Power of , please bring a copy on the day of your procedure. 15. With your permission, one family member may accompany you while you are being prepared for surgery. Once you are ready, additional family members may join you. HOW WE KEEP YOU SAFE and WORK TO PREVENT SURGICAL SITE INFECTIONS:  1. Health care workers should always check your ID bracelet to verify your name and birth date. You will be asked many times to state your name, date of birth, and allergies. 2. Health care workers should always clean their hands with soap or alcohol gel before providing care to you. It is okay to ask anyone if they cleaned their hands before they touch you. 3. You will be actively involved in verifying the type of procedure you are having and ensuring the correct surgical site. This will be confirmed multiple times prior to your procedure. Do NOT pillo your surgery site UNLESS instructed to by your surgeon. 4. Do not shave or wax for 72 hours prior to procedure near your operative site. Shaving with a razor can irritate your skin and make it easier to develop an infection. On the day of your procedure, any hair that needs to be removed near the surgical site will be clipped by a healthcare worker using a special clippers designed to avoid skin irritation. 5. When you are in the operating room, your surgical site will be cleansed with a special soap, and in most cases, you will be given an antibiotic before the surgery begins.       What to expect AFTER YOUR PROCEDURE:  1. Immediately following your procedure, your will be taken to the PACU for the first phase of your recovery. Your nurse will help you recover from any potential side effects of anesthesia, such as extreme drowsiness, changes in your vital signs or breathing patterns. Nausea, headache, muscle aches, or sore throat may also occur after anesthesia. Your nurse will help you manage these potential side effects. 2. For comfort and safety, arrange to have someone at home with you for the first 24 hours after discharge. 3. You and your family will be given written instructions about your diet, activity, dressing care, medications, and return visits. 4. Once at home, should issues with nausea, pain, or bleeding occur, or should you notice any signs of infection, you should call your surgeon. 5. Always clean your hands before and after caring for your wound. Do not let your family touch your surgery site without cleaning their hands. 6. Narcotic pain medications can cause significant constipation. You may want to add a stool softener to your postoperative medication schedule or speak to your surgeon on how best to manage this SIDE EFFECT. SPECIAL INSTRUCTIONS if menstruating, no tampons, may have small bag with personal items, inform nursing checking you  In that your visitor might have to leave and an alternate person will be there. Patient acknowledges  Understanding of pre op instructions. Thank you for allowing us to care for you. We strive to exceed your expectations in the delivery of care and service provided to you and your family. If you need to contact the [place] [address] staff for any reason, please call us at [phone]    Instructions reviewed with patient during preadmission testing phone interview. Blair Marquez. 4/8/2021 .11:48 AM      ADDITIONAL EDUCATIONAL INFORMATION REVIEWED PER PHONE WITH YOU AND/OR YOUR FAMILY:  No Hibiclens® Bathing Instructions Yes Antibacterial Soap

## 2021-04-09 LAB — SARS-COV-2: NOT DETECTED

## 2021-04-09 NOTE — PROGRESS NOTES
[facility] / [facility] ([facility]) [address] [facility], [address]    Acknowledgment of Informed Consent for Surgical or Medical Procedure and Sedation  I agree to allow doctor(s)Yasir Roark Klinefelter,   and his/her associates or assistants, including residents and/or other qualified medical practitioner to perform the following medical treatment or procedure and to administer or direct the administration of sedation as necessary:  Procedure(s): ROBOTIC [address]      My doctor has explained the following regarding the proposed procedure:   the explanation of the procedure   the benefits of the procedure   the potential problems that might occur during recuperation   the risks and side effects of the procedure which could include but are not limited to severe blood loss, infection, stroke or death   the benefits, risks and side effect of alternative procedures including the consequences of declining this procedure or any alternative procedures   the likelihood of achieving satisfactory results. I acknowledge no guarantee or assurance has been made to me regarding the results. I understand that during the course of this treatment/procedure, unforeseen conditions can occur which require an additional or different procedure. I agree to allow my physician or assistants to perform such extension of the original procedure as they may find necessary. I understand that sedation will often result in temporary impairment of memory and fine motor skills and that sedation can occasionally progress to a state of deep sedation or general anesthesia. I understand the risks of anesthesia for surgery include, but are not limited to, sore throat, hoarseness, injury to face, mouth, or teeth; nausea; headache; injury to blood vessels or nerves; death, brain damage, or paralysis.     I understand that if I have a Limitation of Treatment order in effect during my hospitalization, the order may or may not be in effect during this procedure. I give my doctor permission to give me blood or blood products. I understand that there are risks with receiving blood such as hepatitis, AIDS, fever, or allergic reaction. I acknowledge that the risks, benefits, and alternatives of this treatment have been explained to me and that no express or implied warranty has been given by the hospital, any blood bank, or any person or entity as to the blood or blood components transfused. At the discretion of my doctor, I agree to allow observers, equipment/product representatives and allow photographing, and/or televising of the procedure, provided my name or identity is maintained confidentially. I agree the hospital may dispose of or use for scientific or educational purposes any tissue, fluid, or body parts which may be removed.     ________________________________Date________Time______ am/pm  ([facility] [facility])  Patient or Signature of Closest Relative or Legal Guardian    ________________________________Date________Time______am/pm      Page 1 of  1  Witness

## 2021-04-12 ENCOUNTER — TELEPHONE (OUTPATIENT)
Dept: BARIATRICS/WEIGHT MGMT | Age: 36
End: 2021-04-12

## 2021-04-12 ENCOUNTER — ANESTHESIA EVENT (OUTPATIENT)
Dept: OPERATING ROOM | Age: 36
DRG: 403 | End: 2021-04-12
Payer: COMMERCIAL

## 2021-04-12 NOTE — TELEPHONE ENCOUNTER
LM for pt to confirm her 8:45 am arrival for her 10:45 am surgery on 4/13/21. Pt should have completed her pre op diet, doing her clear, liquid diet today and be NPO after midnight. Pt was asked to call the office to confirm receipt of this message.

## 2021-04-13 ENCOUNTER — ANESTHESIA (OUTPATIENT)
Dept: OPERATING ROOM | Age: 36
DRG: 403 | End: 2021-04-13
Payer: COMMERCIAL

## 2021-04-13 ENCOUNTER — HOSPITAL ENCOUNTER (INPATIENT)
Age: 36
LOS: 1 days | Discharge: HOME | DRG: 403 | End: 2021-04-14
Attending: SURGERY | Admitting: SURGERY
Payer: COMMERCIAL

## 2021-04-13 VITALS — SYSTOLIC BLOOD PRESSURE: 127 MMHG | OXYGEN SATURATION: 100 % | DIASTOLIC BLOOD PRESSURE: 70 MMHG | TEMPERATURE: 95.5 F

## 2021-04-13 DIAGNOSIS — E66.01 MORBID OBESITY (HCC): ICD-10-CM

## 2021-04-13 DIAGNOSIS — K44.9 HIATAL HERNIA: ICD-10-CM

## 2021-04-13 DIAGNOSIS — R10.9 ACUTE POSTOPERATIVE PAIN OF ABDOMEN: Primary | ICD-10-CM

## 2021-04-13 DIAGNOSIS — G89.18 ACUTE POSTOPERATIVE PAIN OF ABDOMEN: Primary | ICD-10-CM

## 2021-04-13 LAB
ABO/RH: NORMAL
ALBUMIN SERPL-MCNC: 3.9 G/DL (ref 3.4–5)
ANION GAP SERPL CALCULATED.3IONS-SCNC: 13 MMOL/L (ref 3–16)
ANTIBODY SCREEN: NORMAL
BUN BLDV-MCNC: 6 MG/DL (ref 7–20)
CALCIUM SERPL-MCNC: 8.9 MG/DL (ref 8.3–10.6)
CHLORIDE BLD-SCNC: 104 MMOL/L (ref 99–110)
CO2: 21 MMOL/L (ref 21–32)
CREAT SERPL-MCNC: 0.7 MG/DL (ref 0.6–1.1)
GFR AFRICAN AMERICAN: >60
GFR NON-AFRICAN AMERICAN: >60
GLUCOSE BLD-MCNC: 105 MG/DL (ref 70–99)
PHOSPHORUS: 3.2 MG/DL (ref 2.5–4.9)
POTASSIUM SERPL-SCNC: 4.3 MMOL/L (ref 3.5–5.1)
PREGNANCY, URINE: NEGATIVE
SODIUM BLD-SCNC: 138 MMOL/L (ref 136–145)

## 2021-04-13 PROCEDURE — 88305 TISSUE EXAM BY PATHOLOGIST: CPT

## 2021-04-13 PROCEDURE — 88307 TISSUE EXAM BY PATHOLOGIST: CPT

## 2021-04-13 PROCEDURE — C9113 INJ PANTOPRAZOLE SODIUM, VIA: HCPCS | Performed by: SURGERY

## 2021-04-13 PROCEDURE — 0DB64Z3 EXCISION OF STOMACH, PERCUTANEOUS ENDOSCOPIC APPROACH, VERTICAL: ICD-10-PCS | Performed by: SURGERY

## 2021-04-13 PROCEDURE — 6360000002 HC RX W HCPCS: Performed by: SURGERY

## 2021-04-13 PROCEDURE — 2580000003 HC RX 258: Performed by: NURSE ANESTHETIST, CERTIFIED REGISTERED

## 2021-04-13 PROCEDURE — 2500000003 HC RX 250 WO HCPCS: Performed by: NURSE ANESTHETIST, CERTIFIED REGISTERED

## 2021-04-13 PROCEDURE — 2580000003 HC RX 258: Performed by: SURGERY

## 2021-04-13 PROCEDURE — 7100000001 HC PACU RECOVERY - ADDTL 15 MIN: Performed by: SURGERY

## 2021-04-13 PROCEDURE — 84703 CHORIONIC GONADOTROPIN ASSAY: CPT

## 2021-04-13 PROCEDURE — 3700000000 HC ANESTHESIA ATTENDED CARE: Performed by: SURGERY

## 2021-04-13 PROCEDURE — 2580000003 HC RX 258: Performed by: NURSE PRACTITIONER

## 2021-04-13 PROCEDURE — 7100000000 HC PACU RECOVERY - FIRST 15 MIN: Performed by: SURGERY

## 2021-04-13 PROCEDURE — 3600000009 HC SURGERY ROBOT BASE: Performed by: SURGERY

## 2021-04-13 PROCEDURE — 2709999900 HC NON-CHARGEABLE SUPPLY: Performed by: SURGERY

## 2021-04-13 PROCEDURE — 07BD4ZX EXCISION OF AORTIC LYMPHATIC, PERCUTANEOUS ENDOSCOPIC APPROACH, DIAGNOSTIC: ICD-10-PCS | Performed by: SURGERY

## 2021-04-13 PROCEDURE — 38570 LAPAROSCOPY LYMPH NODE BIOP: CPT | Performed by: SURGERY

## 2021-04-13 PROCEDURE — 2500000003 HC RX 250 WO HCPCS: Performed by: SURGERY

## 2021-04-13 PROCEDURE — 36415 COLL VENOUS BLD VENIPUNCTURE: CPT

## 2021-04-13 PROCEDURE — 6370000000 HC RX 637 (ALT 250 FOR IP): Performed by: NURSE PRACTITIONER

## 2021-04-13 PROCEDURE — 6370000000 HC RX 637 (ALT 250 FOR IP): Performed by: SURGERY

## 2021-04-13 PROCEDURE — 1200000000 HC SEMI PRIVATE

## 2021-04-13 PROCEDURE — 86901 BLOOD TYPING SEROLOGIC RH(D): CPT

## 2021-04-13 PROCEDURE — 3600000019 HC SURGERY ROBOT ADDTL 15MIN: Performed by: SURGERY

## 2021-04-13 PROCEDURE — 80069 RENAL FUNCTION PANEL: CPT

## 2021-04-13 PROCEDURE — S2900 ROBOTIC SURGICAL SYSTEM: HCPCS | Performed by: SURGERY

## 2021-04-13 PROCEDURE — 86900 BLOOD TYPING SEROLOGIC ABO: CPT

## 2021-04-13 PROCEDURE — 86850 RBC ANTIBODY SCREEN: CPT

## 2021-04-13 PROCEDURE — 94761 N-INVAS EAR/PLS OXIMETRY MLT: CPT

## 2021-04-13 PROCEDURE — 6360000002 HC RX W HCPCS: Performed by: NURSE ANESTHETIST, CERTIFIED REGISTERED

## 2021-04-13 PROCEDURE — 2720000010 HC SURG SUPPLY STERILE: Performed by: SURGERY

## 2021-04-13 PROCEDURE — 3700000001 HC ADD 15 MINUTES (ANESTHESIA): Performed by: SURGERY

## 2021-04-13 PROCEDURE — 8E0W4CZ ROBOTIC ASSISTED PROCEDURE OF TRUNK REGION, PERCUTANEOUS ENDOSCOPIC APPROACH: ICD-10-PCS | Performed by: SURGERY

## 2021-04-13 PROCEDURE — 43775 LAP SLEEVE GASTRECTOMY: CPT | Performed by: SURGERY

## 2021-04-13 PROCEDURE — 43281 LAP PARAESOPHAG HERN REPAIR: CPT | Performed by: SURGERY

## 2021-04-13 PROCEDURE — 0BQT4ZZ REPAIR DIAPHRAGM, PERCUTANEOUS ENDOSCOPIC APPROACH: ICD-10-PCS | Performed by: SURGERY

## 2021-04-13 PROCEDURE — 2700000000 HC OXYGEN THERAPY PER DAY

## 2021-04-13 PROCEDURE — 6360000002 HC RX W HCPCS: Performed by: NURSE PRACTITIONER

## 2021-04-13 PROCEDURE — 6360000002 HC RX W HCPCS: Performed by: ANESTHESIOLOGY

## 2021-04-13 RX ORDER — APREPITANT 40 MG/1
80 CAPSULE ORAL ONCE
Status: COMPLETED | OUTPATIENT
Start: 2021-04-13 | End: 2021-04-13

## 2021-04-13 RX ORDER — FENTANYL CITRATE 50 UG/ML
25 INJECTION, SOLUTION INTRAMUSCULAR; INTRAVENOUS EVERY 5 MIN PRN
Status: DISCONTINUED | OUTPATIENT
Start: 2021-04-13 | End: 2021-04-13 | Stop reason: HOSPADM

## 2021-04-13 RX ORDER — SODIUM CHLORIDE, SODIUM LACTATE, POTASSIUM CHLORIDE, CALCIUM CHLORIDE 600; 310; 30; 20 MG/100ML; MG/100ML; MG/100ML; MG/100ML
INJECTION, SOLUTION INTRAVENOUS CONTINUOUS
Status: DISCONTINUED | OUTPATIENT
Start: 2021-04-13 | End: 2021-04-13

## 2021-04-13 RX ORDER — PREGABALIN 150 MG/1
150 CAPSULE ORAL ONCE
Status: COMPLETED | OUTPATIENT
Start: 2021-04-13 | End: 2021-04-13

## 2021-04-13 RX ORDER — METOCLOPRAMIDE HYDROCHLORIDE 5 MG/ML
10 INJECTION INTRAMUSCULAR; INTRAVENOUS EVERY 6 HOURS PRN
Status: DISCONTINUED | OUTPATIENT
Start: 2021-04-13 | End: 2021-04-14 | Stop reason: HOSPADM

## 2021-04-13 RX ORDER — ONDANSETRON 2 MG/ML
4 INJECTION INTRAMUSCULAR; INTRAVENOUS EVERY 6 HOURS PRN
Status: DISCONTINUED | OUTPATIENT
Start: 2021-04-13 | End: 2021-04-14 | Stop reason: HOSPADM

## 2021-04-13 RX ORDER — PROPOFOL 10 MG/ML
INJECTION, EMULSION INTRAVENOUS PRN
Status: DISCONTINUED | OUTPATIENT
Start: 2021-04-13 | End: 2021-04-13 | Stop reason: SDUPTHER

## 2021-04-13 RX ORDER — LIDOCAINE HYDROCHLORIDE 20 MG/ML
INJECTION, SOLUTION INTRAVENOUS PRN
Status: DISCONTINUED | OUTPATIENT
Start: 2021-04-13 | End: 2021-04-13 | Stop reason: SDUPTHER

## 2021-04-13 RX ORDER — METHYLENE BLUE 10 MG/ML
INJECTION INTRAVENOUS PRN
Status: DISCONTINUED | OUTPATIENT
Start: 2021-04-13 | End: 2021-04-13 | Stop reason: HOSPADM

## 2021-04-13 RX ORDER — PROCHLORPERAZINE EDISYLATE 5 MG/ML
10 INJECTION INTRAMUSCULAR; INTRAVENOUS EVERY 6 HOURS PRN
Status: DISCONTINUED | OUTPATIENT
Start: 2021-04-13 | End: 2021-04-14 | Stop reason: HOSPADM

## 2021-04-13 RX ORDER — FENTANYL CITRATE 50 UG/ML
50 INJECTION, SOLUTION INTRAMUSCULAR; INTRAVENOUS EVERY 5 MIN PRN
Status: DISCONTINUED | OUTPATIENT
Start: 2021-04-13 | End: 2021-04-13 | Stop reason: HOSPADM

## 2021-04-13 RX ORDER — SODIUM CHLORIDE 0.9 % (FLUSH) 0.9 %
5-40 SYRINGE (ML) INJECTION PRN
Status: DISCONTINUED | OUTPATIENT
Start: 2021-04-13 | End: 2021-04-14 | Stop reason: HOSPADM

## 2021-04-13 RX ORDER — OXYCODONE HYDROCHLORIDE AND ACETAMINOPHEN 5; 325 MG/1; MG/1
2 TABLET ORAL PRN
Status: DISCONTINUED | OUTPATIENT
Start: 2021-04-13 | End: 2021-04-13 | Stop reason: HOSPADM

## 2021-04-13 RX ORDER — MAGNESIUM HYDROXIDE 1200 MG/15ML
LIQUID ORAL PRN
Status: DISCONTINUED | OUTPATIENT
Start: 2021-04-13 | End: 2021-04-13 | Stop reason: HOSPADM

## 2021-04-13 RX ORDER — ONDANSETRON 2 MG/ML
INJECTION INTRAMUSCULAR; INTRAVENOUS PRN
Status: DISCONTINUED | OUTPATIENT
Start: 2021-04-13 | End: 2021-04-13 | Stop reason: SDUPTHER

## 2021-04-13 RX ORDER — ACETAMINOPHEN 160 MG/5ML
650 SOLUTION ORAL ONCE
Status: COMPLETED | OUTPATIENT
Start: 2021-04-13 | End: 2021-04-13

## 2021-04-13 RX ORDER — SODIUM CHLORIDE 9 MG/ML
25 INJECTION, SOLUTION INTRAVENOUS PRN
Status: DISCONTINUED | OUTPATIENT
Start: 2021-04-13 | End: 2021-04-13 | Stop reason: HOSPADM

## 2021-04-13 RX ORDER — LABETALOL HYDROCHLORIDE 5 MG/ML
5 INJECTION, SOLUTION INTRAVENOUS EVERY 10 MIN PRN
Status: DISCONTINUED | OUTPATIENT
Start: 2021-04-13 | End: 2021-04-13 | Stop reason: HOSPADM

## 2021-04-13 RX ORDER — SODIUM CHLORIDE 0.9 % (FLUSH) 0.9 %
5-40 SYRINGE (ML) INJECTION PRN
Status: DISCONTINUED | OUTPATIENT
Start: 2021-04-13 | End: 2021-04-13 | Stop reason: HOSPADM

## 2021-04-13 RX ORDER — ROCURONIUM BROMIDE 10 MG/ML
INJECTION, SOLUTION INTRAVENOUS PRN
Status: DISCONTINUED | OUTPATIENT
Start: 2021-04-13 | End: 2021-04-13 | Stop reason: SDUPTHER

## 2021-04-13 RX ORDER — SODIUM CHLORIDE 9 MG/ML
25 INJECTION, SOLUTION INTRAVENOUS PRN
Status: DISCONTINUED | OUTPATIENT
Start: 2021-04-13 | End: 2021-04-14 | Stop reason: HOSPADM

## 2021-04-13 RX ORDER — LIDOCAINE HYDROCHLORIDE 10 MG/ML
1 INJECTION, SOLUTION EPIDURAL; INFILTRATION; INTRACAUDAL; PERINEURAL
Status: DISCONTINUED | OUTPATIENT
Start: 2021-04-13 | End: 2021-04-13 | Stop reason: HOSPADM

## 2021-04-13 RX ORDER — SCOLOPAMINE TRANSDERMAL SYSTEM 1 MG/1
1 PATCH, EXTENDED RELEASE TRANSDERMAL ONCE
Status: DISCONTINUED | OUTPATIENT
Start: 2021-04-13 | End: 2021-04-14 | Stop reason: HOSPADM

## 2021-04-13 RX ORDER — PANTOPRAZOLE SODIUM 40 MG/10ML
40 INJECTION, POWDER, LYOPHILIZED, FOR SOLUTION INTRAVENOUS DAILY
Status: DISCONTINUED | OUTPATIENT
Start: 2021-04-13 | End: 2021-04-14 | Stop reason: HOSPADM

## 2021-04-13 RX ORDER — HYDRALAZINE HYDROCHLORIDE 20 MG/ML
5 INJECTION INTRAMUSCULAR; INTRAVENOUS EVERY 10 MIN PRN
Status: DISCONTINUED | OUTPATIENT
Start: 2021-04-13 | End: 2021-04-13 | Stop reason: HOSPADM

## 2021-04-13 RX ORDER — LIDOCAINE 4 G/G
1 PATCH TOPICAL DAILY
Status: DISCONTINUED | OUTPATIENT
Start: 2021-04-13 | End: 2021-04-14 | Stop reason: HOSPADM

## 2021-04-13 RX ORDER — MIDAZOLAM HYDROCHLORIDE 1 MG/ML
INJECTION INTRAMUSCULAR; INTRAVENOUS PRN
Status: DISCONTINUED | OUTPATIENT
Start: 2021-04-13 | End: 2021-04-13 | Stop reason: SDUPTHER

## 2021-04-13 RX ORDER — SODIUM CHLORIDE 9 MG/ML
INJECTION, SOLUTION INTRAVENOUS CONTINUOUS
Status: DISCONTINUED | OUTPATIENT
Start: 2021-04-13 | End: 2021-04-14 | Stop reason: HOSPADM

## 2021-04-13 RX ORDER — SCOLOPAMINE TRANSDERMAL SYSTEM 1 MG/1
1 PATCH, EXTENDED RELEASE TRANSDERMAL
Status: DISCONTINUED | OUTPATIENT
Start: 2021-04-16 | End: 2021-04-14 | Stop reason: HOSPADM

## 2021-04-13 RX ORDER — DEXAMETHASONE SODIUM PHOSPHATE 4 MG/ML
INJECTION, SOLUTION INTRA-ARTICULAR; INTRALESIONAL; INTRAMUSCULAR; INTRAVENOUS; SOFT TISSUE PRN
Status: DISCONTINUED | OUTPATIENT
Start: 2021-04-13 | End: 2021-04-13 | Stop reason: SDUPTHER

## 2021-04-13 RX ORDER — SODIUM CHLORIDE, SODIUM LACTATE, POTASSIUM CHLORIDE, CALCIUM CHLORIDE 600; 310; 30; 20 MG/100ML; MG/100ML; MG/100ML; MG/100ML
INJECTION, SOLUTION INTRAVENOUS CONTINUOUS PRN
Status: DISCONTINUED | OUTPATIENT
Start: 2021-04-13 | End: 2021-04-13 | Stop reason: SDUPTHER

## 2021-04-13 RX ORDER — NALOXONE HYDROCHLORIDE 0.4 MG/ML
0.4 INJECTION, SOLUTION INTRAMUSCULAR; INTRAVENOUS; SUBCUTANEOUS PRN
Status: DISCONTINUED | OUTPATIENT
Start: 2021-04-13 | End: 2021-04-14

## 2021-04-13 RX ORDER — SODIUM CHLORIDE 0.9 % (FLUSH) 0.9 %
5-40 SYRINGE (ML) INJECTION EVERY 12 HOURS SCHEDULED
Status: DISCONTINUED | OUTPATIENT
Start: 2021-04-13 | End: 2021-04-14 | Stop reason: HOSPADM

## 2021-04-13 RX ORDER — SODIUM CHLORIDE 0.9 % (FLUSH) 0.9 %
5-40 SYRINGE (ML) INJECTION EVERY 12 HOURS SCHEDULED
Status: DISCONTINUED | OUTPATIENT
Start: 2021-04-13 | End: 2021-04-13 | Stop reason: HOSPADM

## 2021-04-13 RX ORDER — FENTANYL CITRATE 50 UG/ML
INJECTION, SOLUTION INTRAMUSCULAR; INTRAVENOUS PRN
Status: DISCONTINUED | OUTPATIENT
Start: 2021-04-13 | End: 2021-04-13 | Stop reason: SDUPTHER

## 2021-04-13 RX ORDER — HYDROMORPHONE HCL 110MG/55ML
PATIENT CONTROLLED ANALGESIA SYRINGE INTRAVENOUS PRN
Status: DISCONTINUED | OUTPATIENT
Start: 2021-04-13 | End: 2021-04-13 | Stop reason: SDUPTHER

## 2021-04-13 RX ORDER — BUPIVACAINE HYDROCHLORIDE 5 MG/ML
INJECTION, SOLUTION EPIDURAL; INTRACAUDAL PRN
Status: DISCONTINUED | OUTPATIENT
Start: 2021-04-13 | End: 2021-04-13 | Stop reason: HOSPADM

## 2021-04-13 RX ORDER — SODIUM CHLORIDE, SODIUM LACTATE, POTASSIUM CHLORIDE, AND CALCIUM CHLORIDE .6; .31; .03; .02 G/100ML; G/100ML; G/100ML; G/100ML
IRRIGANT IRRIGATION PRN
Status: DISCONTINUED | OUTPATIENT
Start: 2021-04-13 | End: 2021-04-13 | Stop reason: HOSPADM

## 2021-04-13 RX ORDER — ONDANSETRON 2 MG/ML
4 INJECTION INTRAMUSCULAR; INTRAVENOUS
Status: DISCONTINUED | OUTPATIENT
Start: 2021-04-13 | End: 2021-04-13 | Stop reason: HOSPADM

## 2021-04-13 RX ORDER — OXYCODONE HYDROCHLORIDE AND ACETAMINOPHEN 5; 325 MG/1; MG/1
1 TABLET ORAL PRN
Status: DISCONTINUED | OUTPATIENT
Start: 2021-04-13 | End: 2021-04-13 | Stop reason: HOSPADM

## 2021-04-13 RX ORDER — SODIUM CHLORIDE 9 MG/ML
10 INJECTION INTRAVENOUS DAILY
Status: DISCONTINUED | OUTPATIENT
Start: 2021-04-13 | End: 2021-04-14 | Stop reason: HOSPADM

## 2021-04-13 RX ADMIN — HYDROMORPHONE HYDROCHLORIDE 0.5 MG: 1 INJECTION, SOLUTION INTRAMUSCULAR; INTRAVENOUS; SUBCUTANEOUS at 15:34

## 2021-04-13 RX ADMIN — ONDANSETRON 4 MG: 2 INJECTION INTRAMUSCULAR; INTRAVENOUS at 12:34

## 2021-04-13 RX ADMIN — PANTOPRAZOLE SODIUM 40 MG: 40 INJECTION, POWDER, FOR SOLUTION INTRAVENOUS at 17:16

## 2021-04-13 RX ADMIN — ACETAMINOPHEN 650 MG: 650 SOLUTION ORAL at 09:31

## 2021-04-13 RX ADMIN — PROPOFOL 200 MG: 10 INJECTION, EMULSION INTRAVENOUS at 10:41

## 2021-04-13 RX ADMIN — HYDROMORPHONE HYDROCHLORIDE 1 MG: 2 INJECTION, SOLUTION INTRAMUSCULAR; INTRAVENOUS; SUBCUTANEOUS at 12:11

## 2021-04-13 RX ADMIN — METHOCARBAMOL 1000 MG: 100 INJECTION, SOLUTION INTRAMUSCULAR; INTRAVENOUS at 17:16

## 2021-04-13 RX ADMIN — SODIUM CHLORIDE: 9 INJECTION, SOLUTION INTRAVENOUS at 15:00

## 2021-04-13 RX ADMIN — HYOSCYAMINE SULFATE 125 MCG: 0.12 TABLET, ORALLY DISINTEGRATING ORAL at 23:05

## 2021-04-13 RX ADMIN — PHENYLEPHRINE HYDROCHLORIDE 100 MCG: 10 INJECTION, SOLUTION INTRAMUSCULAR; INTRAVENOUS; SUBCUTANEOUS at 11:43

## 2021-04-13 RX ADMIN — HYDROMORPHONE HYDROCHLORIDE 0.5 MG: 1 INJECTION, SOLUTION INTRAMUSCULAR; INTRAVENOUS; SUBCUTANEOUS at 13:25

## 2021-04-13 RX ADMIN — SODIUM CHLORIDE, SODIUM LACTATE, POTASSIUM CHLORIDE, AND CALCIUM CHLORIDE: .6; .31; .03; .02 INJECTION, SOLUTION INTRAVENOUS at 10:35

## 2021-04-13 RX ADMIN — SODIUM CHLORIDE, SODIUM LACTATE, POTASSIUM CHLORIDE, AND CALCIUM CHLORIDE: .6; .31; .03; .02 INJECTION, SOLUTION INTRAVENOUS at 10:52

## 2021-04-13 RX ADMIN — PHENYLEPHRINE HYDROCHLORIDE 50 MCG: 10 INJECTION, SOLUTION INTRAMUSCULAR; INTRAVENOUS; SUBCUTANEOUS at 11:23

## 2021-04-13 RX ADMIN — HYDROMORPHONE HYDROCHLORIDE 0.5 MG: 2 INJECTION, SOLUTION INTRAMUSCULAR; INTRAVENOUS; SUBCUTANEOUS at 12:17

## 2021-04-13 RX ADMIN — PREGABALIN 150 MG: 150 CAPSULE ORAL at 09:31

## 2021-04-13 RX ADMIN — DEXAMETHASONE SODIUM PHOSPHATE 8 MG: 4 INJECTION, SOLUTION INTRAMUSCULAR; INTRAVENOUS at 11:07

## 2021-04-13 RX ADMIN — ROCURONIUM BROMIDE 60 MG: 10 INJECTION INTRAVENOUS at 10:42

## 2021-04-13 RX ADMIN — Medication 10 ML: at 17:17

## 2021-04-13 RX ADMIN — FENTANYL CITRATE 50 MCG: 50 INJECTION, SOLUTION INTRAMUSCULAR; INTRAVENOUS at 10:38

## 2021-04-13 RX ADMIN — CEFAZOLIN 3000 MG: 10 INJECTION, POWDER, FOR SOLUTION INTRAVENOUS at 10:50

## 2021-04-13 RX ADMIN — ENOXAPARIN SODIUM 40 MG: 40 INJECTION SUBCUTANEOUS at 09:31

## 2021-04-13 RX ADMIN — HYDROMORPHONE HYDROCHLORIDE 0.5 MG: 2 INJECTION, SOLUTION INTRAMUSCULAR; INTRAVENOUS; SUBCUTANEOUS at 12:05

## 2021-04-13 RX ADMIN — ROCURONIUM BROMIDE 30 MG: 10 INJECTION INTRAVENOUS at 11:35

## 2021-04-13 RX ADMIN — FENTANYL CITRATE 50 MCG: 50 INJECTION, SOLUTION INTRAMUSCULAR; INTRAVENOUS at 11:03

## 2021-04-13 RX ADMIN — HYDROMORPHONE HYDROCHLORIDE: 10 INJECTION, SOLUTION INTRAMUSCULAR; INTRAVENOUS; SUBCUTANEOUS at 14:29

## 2021-04-13 RX ADMIN — SUGAMMADEX 200 MG: 100 INJECTION, SOLUTION INTRAVENOUS at 12:45

## 2021-04-13 RX ADMIN — LIDOCAINE HYDROCHLORIDE 50 MG: 20 INJECTION, SOLUTION INTRAVENOUS at 10:39

## 2021-04-13 RX ADMIN — APREPITANT 80 MG: 40 CAPSULE ORAL at 09:30

## 2021-04-13 RX ADMIN — MIDAZOLAM HYDROCHLORIDE 2 MG: 2 INJECTION, SOLUTION INTRAMUSCULAR; INTRAVENOUS at 10:33

## 2021-04-13 RX ADMIN — LIDOCAINE HYDROCHLORIDE 50 MG: 20 INJECTION, SOLUTION INTRAVENOUS at 10:41

## 2021-04-13 ASSESSMENT — PULMONARY FUNCTION TESTS
PIF_VALUE: 29
PIF_VALUE: 30
PIF_VALUE: 19
PIF_VALUE: 27
PIF_VALUE: 22
PIF_VALUE: 28
PIF_VALUE: 27
PIF_VALUE: 26
PIF_VALUE: 28
PIF_VALUE: 19
PIF_VALUE: 22
PIF_VALUE: 27
PIF_VALUE: 19
PIF_VALUE: 27
PIF_VALUE: 19
PIF_VALUE: 27
PIF_VALUE: 27
PIF_VALUE: 30
PIF_VALUE: 28
PIF_VALUE: 19
PIF_VALUE: 28
PIF_VALUE: 30
PIF_VALUE: 27
PIF_VALUE: 26
PIF_VALUE: 28
PIF_VALUE: 27
PIF_VALUE: 28
PIF_VALUE: 19
PIF_VALUE: 19
PIF_VALUE: 28
PIF_VALUE: 27
PIF_VALUE: 27
PIF_VALUE: 26
PIF_VALUE: 28
PIF_VALUE: 30
PIF_VALUE: 27
PIF_VALUE: 27
PIF_VALUE: 26
PIF_VALUE: 27
PIF_VALUE: 27
PIF_VALUE: 1
PIF_VALUE: 27
PIF_VALUE: 22
PIF_VALUE: 27
PIF_VALUE: 27
PIF_VALUE: 21
PIF_VALUE: 26
PIF_VALUE: 20
PIF_VALUE: 22
PIF_VALUE: 21
PIF_VALUE: 1
PIF_VALUE: 27
PIF_VALUE: 27
PIF_VALUE: 26

## 2021-04-13 ASSESSMENT — PAIN SCALES - GENERAL
PAINLEVEL_OUTOF10: 0
PAINLEVEL_OUTOF10: 9
PAINLEVEL_OUTOF10: 9

## 2021-04-13 ASSESSMENT — PAIN DESCRIPTION - LOCATION: LOCATION: ABDOMEN

## 2021-04-13 ASSESSMENT — PAIN DESCRIPTION - FREQUENCY
FREQUENCY: INTERMITTENT
FREQUENCY: CONTINUOUS

## 2021-04-13 ASSESSMENT — PAIN DESCRIPTION - DESCRIPTORS: DESCRIPTORS: PRESSURE

## 2021-04-13 ASSESSMENT — PAIN DESCRIPTION - PAIN TYPE: TYPE: SURGICAL PAIN

## 2021-04-13 ASSESSMENT — PAIN DESCRIPTION - ONSET: ONSET: ON-GOING

## 2021-04-13 ASSESSMENT — PAIN DESCRIPTION - PROGRESSION: CLINICAL_PROGRESSION: GRADUALLY WORSENING

## 2021-04-13 ASSESSMENT — PAIN - FUNCTIONAL ASSESSMENT: PAIN_FUNCTIONAL_ASSESSMENT: PREVENTS OR INTERFERES WITH MANY ACTIVE NOT PASSIVE ACTIVITIES
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
## 2021-04-13 NOTE — OP NOTE
[facility]) Physicians   [employer]              Procedure Note    Indications: The patient was evaluated by our multidisciplinary team and was found to be a good candidate for weight loss surgery. Body mass index is 39.02 kg/m². Pre-operative Diagnosis:   Patient Active Problem List   Diagnosis    Obstructive sleep apnea syndrome    Hiatal hernia with GERD    Prediabetes    Morbid obesity with BMI of 40.0-44.9, adult ([address]         Post-operative Diagnosis:   Same      Procedure:    - Robotic Sleeve Gastrectomy  - Robotic Hiatal Hernia Repair   - Robotic removal intra-peritoneal lymph node      Surgeon: Rashaad Lemus DO    Anesthesia: General endotracheal anesthesia        Procedure Details   The patient was seen again in the Holding Room. The risks, benefits, complications, treatment options, and expected outcomes were discussed with the patient and/or family. The possibilities of reaction to medication, pulmonary aspiration, perforation of viscus, bleeding, recurrent infection, strictures, leaks, failure to lose weight, regaining weight,  the need for additional procedures, failure to diagnose a condition, and creating a complication requiring transfusion or operation were discussed. There was concurrence with the proposed plan and informed consent was obtained. The site of surgery was properly noted/marked. The patient was taken to Operating Room, identified as [place] and the procedure verified as Laparoscopic Sleeve Gastrectomy. A Time Out was held and the above information confirmed. The patient was placed in the supine position and general anesthesia was induced, along with placement of orogastric tube and SCD's. Lovenox SQ given pre-operatively. IV Antibiotics given pre-operatively. All pressure points were padded properly.      A left upper quadrant incision was made and a veres needle was inserted after confirming with saline drop test.  After adequate pneumoperitoneum was obtained, a 5 mm trocar was inserted under direct vision and there was no injury on initial trocar placement. Additional ports were placed in the standard positions under direct vision. The abdomen was initially explored and no abnormalities were identified. A liver retractor was inserted through a small incision in the upper midline, lifted the liver upward, and was then secured to the OR table. The pylorus was identified and measurement was taken approximately 4 cm from the pylorus along the greater curvature of the stomach. The vessel sealer was used to take down the attachments and short gastric vessels along the greater curve of the stomach. This was continued until all attachments were taken down and continued to the gastro-esophageal junction. A 36 Mosotho dilator was advanced along the lesser curvature and into the pylorus. A large perigastric intra-peritoneal lymph node was found posterior to the stomach close to mid-stomach. Meticulous dissection was done to excise this in entirety. Hemostasis was maintained and specimen sent for permanent pathologic examination. A hiatal hernia was identified. Began the dissection by dividing the pars flaccida and then mobilizing the esophagus at the right darya, identifying the junction with the left darya lateral to the esophagus. I then divided the phrenoesophageal membrane anteriorly. The esophagus was freed from the left darya. The dissection was done high into the mediastinum, identifying the aorta as well as the inferior pulmonary vein. The hiatal hernia sac was completely freed and excised. The esophagus was now mobilized at the hiatus. The crura was then closed up to the esophagus with interrupted 0 ethibond. There was approximately 4 cm of Intraabdominal esophagus. Vagus nerves were protected and away from dissection. Made sure it was not too tight on the esophagus and the Boogie passed easily through it.      A stapler was fired along the

## 2021-04-13 NOTE — PROGRESS NOTES
Admitted to pacu at this time. VSS on monitor. Report given by CRNA. No pain or nausea at this time.
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
## 2021-04-13 NOTE — PROGRESS NOTES
Admission Progress Note  4/13/2021 4:02 PM     Data: Patient to room 5310 from PACU. See doc flow sheets for assessments and screenings. Action: Patient oriented to room and call light. Patient informed of plan of care. Reviewed the patient education folder with the patient and/or family. Patient instructed to call nurse with any needs or concerns. Response:  Patient verbalized understanding of plan of care and all instructions. Bed locked and in lowest position. Call light in reach. Will continue to monitor patient per plan of care.     Edith Fernandez RN Electronically signed by Edith Fernandez RN on 4/13/2021 at 4:02 PM    ________________________________________________________________________

## 2021-04-13 NOTE — PROGRESS NOTES
PACU Transfer Note    Vitals:    04/13/21 1515   BP: (!) 147/91   Pulse: 78   Resp: 11   Temp: 96.2 °F (35.7 °C)   SpO2: 100%       In: 332 [I.V.:332]  Out: -     Pain assessment:   Pain Level: 0(asleep;; 8 to 9 when awakened)    Report given to Receiving unit RN.    4/13/2021 3:30 PM

## 2021-04-13 NOTE — PLAN OF CARE
Problem: Infection - Surgical Site:  Goal: Will show no infection signs and symptoms  Description: Will show no infection signs and symptoms  Note: Surgical sites clean, dry and intact. No redness or drainage noted. Will continue to monitor     Problem: Pain:  Description: Pain management should include both nonpharmacologic and pharmacologic interventions. Goal: Pain level will decrease  Description: Pain level will decrease  Note: Pt rates pain 8/10. PCA pump in place.  Will continue to monitor

## 2021-04-13 NOTE — LETTER
[address] [facility]  [address] [address]  Phone: [phone]             April 14, 2021    Patient: Ciara Rosa   YOB: 1985   Date of Visit: 4/13/2021       To Whom It May Concern:    Ciara Rosa was seen and treated in our facility  beginning 4/13/2021 until 4/14/21. She will be seen in the office in two weeks. During this period of time she will need to be off work to recover. This will be re-evaluated at her appointment on 4/ 28/21. Please call the office if you have any questions [phone].        Sincerely,       ROSIE Jacinto CNP         Signature:__________________________________

## 2021-04-13 NOTE — PROGRESS NOTES
Pt up to chair. IV in right forearm removed because d/t infiltrated. Pt able to void. Tolerating ice chips. Using PCA pump as educated. Pt currently resting in chair. Call light in reach. Visitor at bedside. Will continue to monitor.  Electronically signed by Stephanie Ariza RN on 4/13/2021 at 6:13 PM
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
## 2021-04-14 VITALS
SYSTOLIC BLOOD PRESSURE: 104 MMHG | OXYGEN SATURATION: 96 % | RESPIRATION RATE: 16 BRPM | BODY MASS INDEX: 38.57 KG/M2 | DIASTOLIC BLOOD PRESSURE: 71 MMHG | HEART RATE: 72 BPM | HEIGHT: 69 IN | TEMPERATURE: 98 F | WEIGHT: 260.4 LBS

## 2021-04-14 LAB
ANION GAP SERPL CALCULATED.3IONS-SCNC: 14 MMOL/L (ref 3–16)
BUN BLDV-MCNC: 4 MG/DL (ref 7–20)
CALCIUM SERPL-MCNC: 9.1 MG/DL (ref 8.3–10.6)
CHLORIDE BLD-SCNC: 108 MMOL/L (ref 99–110)
CO2: 17 MMOL/L (ref 21–32)
CREAT SERPL-MCNC: 0.7 MG/DL (ref 0.6–1.1)
GFR AFRICAN AMERICAN: >60
GFR NON-AFRICAN AMERICAN: >60
GLUCOSE BLD-MCNC: 81 MG/DL (ref 70–99)
HCT VFR BLD CALC: 38.7 % (ref 36–48)
HEMOGLOBIN: 12.2 G/DL (ref 12–16)
MCH RBC QN AUTO: 26.4 PG (ref 26–34)
MCHC RBC AUTO-ENTMCNC: 31.6 G/DL (ref 31–36)
MCV RBC AUTO: 83.5 FL (ref 80–100)
PDW BLD-RTO: 15.8 % (ref 12.4–15.4)
PLATELET # BLD: 482 K/UL (ref 135–450)
PMV BLD AUTO: 7.1 FL (ref 5–10.5)
POTASSIUM SERPL-SCNC: 4.4 MMOL/L (ref 3.5–5.1)
RBC # BLD: 4.63 M/UL (ref 4–5.2)
SODIUM BLD-SCNC: 139 MMOL/L (ref 136–145)
WBC # BLD: 15 K/UL (ref 4–11)

## 2021-04-14 PROCEDURE — 85027 COMPLETE CBC AUTOMATED: CPT

## 2021-04-14 PROCEDURE — 2580000003 HC RX 258: Performed by: NURSE PRACTITIONER

## 2021-04-14 PROCEDURE — 6370000000 HC RX 637 (ALT 250 FOR IP): Performed by: SURGERY

## 2021-04-14 PROCEDURE — 6360000002 HC RX W HCPCS: Performed by: SURGERY

## 2021-04-14 PROCEDURE — 36415 COLL VENOUS BLD VENIPUNCTURE: CPT

## 2021-04-14 PROCEDURE — 94150 VITAL CAPACITY TEST: CPT

## 2021-04-14 PROCEDURE — 6360000002 HC RX W HCPCS: Performed by: NURSE PRACTITIONER

## 2021-04-14 PROCEDURE — 80048 BASIC METABOLIC PNL TOTAL CA: CPT

## 2021-04-14 PROCEDURE — 6370000000 HC RX 637 (ALT 250 FOR IP): Performed by: NURSE PRACTITIONER

## 2021-04-14 PROCEDURE — C9113 INJ PANTOPRAZOLE SODIUM, VIA: HCPCS | Performed by: SURGERY

## 2021-04-14 RX ORDER — KETOROLAC TROMETHAMINE 30 MG/ML
15 INJECTION, SOLUTION INTRAMUSCULAR; INTRAVENOUS ONCE
Status: COMPLETED | OUTPATIENT
Start: 2021-04-14 | End: 2021-04-14

## 2021-04-14 RX ORDER — OMEPRAZOLE 20 MG/1
20 CAPSULE, DELAYED RELEASE ORAL DAILY
Qty: 30 CAPSULE | Refills: 3 | Status: SHIPPED | OUTPATIENT
Start: 2021-04-14 | End: 2021-05-26

## 2021-04-14 RX ORDER — OXYCODONE HCL 5 MG/5 ML
5 SOLUTION, ORAL ORAL EVERY 4 HOURS PRN
Status: DISCONTINUED | OUTPATIENT
Start: 2021-04-14 | End: 2021-04-14 | Stop reason: HOSPADM

## 2021-04-14 RX ORDER — OXYCODONE HYDROCHLORIDE AND ACETAMINOPHEN 5; 325 MG/1; MG/1
1 TABLET ORAL EVERY 6 HOURS PRN
Qty: 28 TABLET | Refills: 0 | Status: SHIPPED | OUTPATIENT
Start: 2021-04-14 | End: 2021-04-21

## 2021-04-14 RX ORDER — METHOCARBAMOL 750 MG/1
750 TABLET, FILM COATED ORAL 4 TIMES DAILY
Qty: 40 TABLET | Refills: 0 | Status: SHIPPED | OUTPATIENT
Start: 2021-04-14 | End: 2021-04-24

## 2021-04-14 RX ORDER — HYOSCYAMINE SULFATE 0.12 MG/1
1 TABLET SUBLINGUAL EVERY 6 HOURS PRN
Qty: 30 EACH | Refills: 0 | Status: SHIPPED | OUTPATIENT
Start: 2021-04-14 | End: 2021-05-26

## 2021-04-14 RX ORDER — SIMETHICONE 80 MG
80 TABLET,CHEWABLE ORAL EVERY 6 HOURS PRN
Status: DISCONTINUED | OUTPATIENT
Start: 2021-04-14 | End: 2021-04-14 | Stop reason: HOSPADM

## 2021-04-14 RX ORDER — ONDANSETRON 4 MG/1
4 TABLET, FILM COATED ORAL EVERY 6 HOURS PRN
Qty: 30 TABLET | Refills: 0 | Status: SHIPPED | OUTPATIENT
Start: 2021-04-14 | End: 2021-05-26

## 2021-04-14 RX ORDER — OXYCODONE HCL 5 MG/5 ML
10 SOLUTION, ORAL ORAL EVERY 4 HOURS PRN
Status: DISCONTINUED | OUTPATIENT
Start: 2021-04-14 | End: 2021-04-14 | Stop reason: HOSPADM

## 2021-04-14 RX ADMIN — PANTOPRAZOLE SODIUM 40 MG: 40 INJECTION, POWDER, FOR SOLUTION INTRAVENOUS at 08:45

## 2021-04-14 RX ADMIN — KETOROLAC TROMETHAMINE 15 MG: 30 INJECTION, SOLUTION INTRAMUSCULAR at 14:32

## 2021-04-14 RX ADMIN — ENOXAPARIN SODIUM 40 MG: 40 INJECTION SUBCUTANEOUS at 01:40

## 2021-04-14 RX ADMIN — SIMETHICONE 80 MG: 80 TABLET, CHEWABLE ORAL at 14:32

## 2021-04-14 RX ADMIN — KETOROLAC TROMETHAMINE 15 MG: 30 INJECTION, SOLUTION INTRAMUSCULAR at 08:52

## 2021-04-14 RX ADMIN — METHOCARBAMOL 1000 MG: 100 INJECTION, SOLUTION INTRAMUSCULAR; INTRAVENOUS at 01:40

## 2021-04-14 RX ADMIN — METHOCARBAMOL 1000 MG: 100 INJECTION, SOLUTION INTRAMUSCULAR; INTRAVENOUS at 08:44

## 2021-04-14 RX ADMIN — HYOSCYAMINE SULFATE 125 MCG: 0.12 TABLET, ORALLY DISINTEGRATING ORAL at 11:02

## 2021-04-14 RX ADMIN — OXYCODONE HYDROCHLORIDE 10 MG: 5 SOLUTION ORAL at 13:22

## 2021-04-14 RX ADMIN — OXYCODONE HYDROCHLORIDE 10 MG: 5 SOLUTION ORAL at 06:56

## 2021-04-14 RX ADMIN — HYDROMORPHONE HYDROCHLORIDE 0.25 MG: 1 INJECTION, SOLUTION INTRAMUSCULAR; INTRAVENOUS; SUBCUTANEOUS at 10:28

## 2021-04-14 ASSESSMENT — PAIN SCALES - GENERAL
PAINLEVEL_OUTOF10: 8
PAINLEVEL_OUTOF10: 8

## 2021-04-14 ASSESSMENT — PAIN DESCRIPTION - PROGRESSION: CLINICAL_PROGRESSION: NOT CHANGED

## 2021-04-14 ASSESSMENT — PAIN DESCRIPTION - PAIN TYPE: TYPE: SURGICAL PAIN

## 2021-04-14 ASSESSMENT — PAIN DESCRIPTION - DESCRIPTORS: DESCRIPTORS: PRESSURE;DISCOMFORT

## 2021-04-14 ASSESSMENT — PAIN DESCRIPTION - LOCATION: LOCATION: ABDOMEN

## 2021-04-14 NOTE — FLOWSHEET NOTE
04/14/21 1432   Encounter Summary   Services provided to: Patient   Referral/Consult From: Rounding   Continue Visiting   (4/14, jaden )   Complexity of Encounter Moderate   Length of Encounter 15 minutes   Routine   Type Initial   Assessment Calm; Approachable; Hopeful   Intervention Active listening;Explored feelings, thoughts, concerns;Nurtured hope   Outcome Comfort;Expressed gratitude;Engaged in conversation   Staff Rosa Candelario MA

## 2021-04-14 NOTE — PROGRESS NOTES
Surgery Daily Progress Note  Vladimir Roche  CC: Morbid Obesity  Subjective :  No emesis overnight. No nausea. Pain unrelieved with PCA. States she has not been able to walk very far due to pain. \"Hate to move\". Has never had abdominal surgery before. Is currently sitting up in chair. Objective    Infusions:   HYDROmorphone      sodium chloride      sodium chloride 150 mL/hr at 04/13/21 1625        I/O:I/O last 3 completed shifts: In: 1843.6 [I.V.:1843.6]  Out: 1215 [Urine:1200; Blood:15]           Wt Readings from Last 1 Encounters:   04/13/21 260 lb 6.4 oz (118.1 kg)                 LABS:    Recent Labs     04/14/21  0554   WBC 15.0*   HGB 12.2   HCT 38.7   MCV 83.5   *        Recent Labs     04/13/21 2020      K 4.3      CO2 21   PHOS 3.2   BUN 6*   CREATININE 0.7               Exam:/73   Pulse 78   Temp 99.2 °F (37.3 °C) (Oral)   Resp 17   Ht 5' 8.5\" (1.74 m)   Wt 260 lb 6.4 oz (118.1 kg)   LMP 04/06/2021   SpO2 97%   BMI 39.02 kg/m²      General appearance: alert, appears stated age and cooperative  Lungs: clear to auscultation bilaterally  Heart: regular rate and rhythm, S1, S2 normal, no murmur, click, rub or gallop  Abdomen: soft, appropriately-tender; bowel sounds quiet  Trocar sites well approx      ASSESSMENT/PLAN: Pt. is a 37 y.o. female s/p Robotic Assisted Laparoscopic Sleeve Gastrectomy, HHR, Removal of intraperitoneal lymph node POD# 1    Adjust pain medications-transition to oral and see if this will help for longer periods. Continue to use robaxin, lidoderm,  ICE and abd binder. Start on Bariatric Clears    Pharmacy to review home meds -pills to be crushed for 2 weeks post op    Monitor progress throughout the day. D/C late afternoon as long as able to take adequate po to remain hydrated without IVF, voiding, pain and nausea tolerable with oral meds, using IS frequently and ambulating.     D/W Dr. Trevor Mancini 4/14/2021 6:16 AM  [phone]

## 2021-04-14 NOTE — CARE COORDINATION
Case Management Assessment            Discharge Note                    Date / Time of Note: 4/14/2021 2:20 PM                  Discharge Note Completed by: Zo Trimble    Patient Name: Roverto Raza   YOB: 1985  Diagnosis: Morbid obesity ([facility] [E66.01]  Hiatal hernia [K44.9]  Morbid obesity with BMI of 40.0-44.9, adult ([facility] [E66.01, Z68.41]   Date / Time: 4/13/2021  8:22 AM    Current PCP: Estuardo Humphries MD  Clinic patient: No    Hospitalization in the last 30 days: No    Advance Directives:  Code Status: Full Code  [place] DNR form completed and on chart: Not Indicated    Financial:  Payor: Jose Torres / Plan: Geoffrey Vasquez / Product Type: *No Product type* /      Pharmacy:    Al[facility] Lee [address] [phone] [facility] [phone]  [facility] [address]  Phone: [phone] Fax: [phone]    [place] [address] [address] [facility] [phone] [facility] [phone]  . [address] [address]  Phone: [phone] Fax: [phone]      Assistance purchasing medications?: Potential Assistance Purchasing Medications: No  Assistance provided by Case Management: None at this time    Does patient want to participate in local refill/ meds to beds program?: Yes    Meds To Beds General Rules:  1. Can ONLY be done Monday- Friday between 8:30am-5pm  2. Prescription(s) must be in pharmacy by 3pm to be filled same day  3. Copy of patient's insurance/ prescription drug card and patient face sheet must be sent along with the prescription(s)  4. Cost of Rx cannot be added to hospital bill. If financial assistance is needed, please contact unit  or ;  or  CANNOT provide pharmacy voucher for patients co-pays  5.  Patients can then  the prescription on their way out of the hospital at discharge, or pharmacy can deliver to the bedside if staff is available. (payment due at time of pick-up or delivery - cash, check, or card accepted)     Able to afford home medications/ co-pay costs: Yes    ADLS:  Current PT AM-PAC Score:   /24  Current OT AM-PAC Score:   /24      DISCHARGE Disposition: Home- No Services Needed    LOC at discharge: Not Applicable  NAYANA Completed: Not Indicated    Notification completed in HENS/PAS?:  Not Applicable    IMM Completed:   Not Indicated    Transportation:  Transportation PLAN for discharge: family   Mode of Transport: [address]:  Home Care ordered at discharge: Not [address]: Not Applicable  Orders faxed: No    Durable Medical Equipment:  DME Provider: none  Equipment obtained during hospitalization:     Home Oxygen and Respiratory Equipment:  Oxygen needed at discharge?: Not [address]: Not Applicable  Portable tank available for discharge?: Not Indicated    Dialysis:  Dialysis patient: No    Dialysis Center:  Not Applicable      Additional CM Notes: pt from home will DC home no needs    The Plan for Transition of Care is related to the following treatment goals of Morbid obesity ([facility] [E66.01]  Hiatal hernia [K44.9]  Morbid obesity with BMI of 40.0-44.9, adult ([facility] [E66.01, Z68.41]    The Patient and/or patient representative Leonardo Landis and her family were provided with a choice of provider and agrees with the discharge plan Yes    Freedom of choice list was provided with basic dialogue that supports the patient's individualized plan of care/goals and shares the quality data associated with the providers.  Not Indicated    Care Transitions patient: No    Angella Warner RN  [facility]  Case Management Department  Ph: [phone]  Fax: [phone]

## 2021-04-14 NOTE — PLAN OF CARE
Problem: Fluid Volume - Imbalance:  Goal: Ability to achieve a balanced intake and output will improve  Description: Ability to achieve a balanced intake and output will improve  Outcome: Ongoing  Note: Pt remains NPO w IVF @150ml/hr. Problem: Pain:  Goal: Pain level will decrease  Description: Pain level will decrease  4/14/2021 0320 by Corinne Mead RN  Outcome: Ongoing  Note: Pt encouraged to use call light to notify staff when pain rises beyond tolerable. Pt educated on pain scale and was using call light appropriately. PCA pump in place for pain control, pt resting comfortably @ this time w eyes closed.

## 2021-04-14 NOTE — PLAN OF CARE
Problem: Infection - Surgical Site:  Goal: Will show no infection signs and symptoms  Description: Will show no infection signs and symptoms  Outcome: Ongoing  Note: Will continue monitor pt vs, labs, surgical sites, and other indictors of infection. Problem: Pain:  Goal: Pain level will decrease  Description: Pain level will decrease  4/14/2021 0956 by Flora Chung RN  Outcome: Ongoing  Note: Pt encouraged to use call light to notify staff when pain rises beyond tolerable. Pt educated on pain scale and was using call light appropriately.

## 2021-04-14 NOTE — PROGRESS NOTES
Pt given discharge paperwork and prescriptions. Had no further questions. Iv removed. No complications.

## 2021-04-15 ENCOUNTER — CARE COORDINATION (OUTPATIENT)
Dept: CASE MANAGEMENT | Age: 36
End: 2021-04-15

## 2021-04-16 ENCOUNTER — CARE COORDINATION (OUTPATIENT)
Dept: CASE MANAGEMENT | Age: 36
End: 2021-04-16

## 2021-04-16 NOTE — CARE COORDINATION
[facility] Transitions Initial Follow Up Call    Call within 2 business days of discharge: Yes    Patient:  Dayanna Skaggs Patient :  1985  MRN:  [id number]   Reason for Admission:   covid 19   Discharge Date:  21  RARS: 8      CTC attempt to reach Pt regarding recent hospital discharge. CTC left voice recording with call back number requesting a call back. Follow up appointments:    Future Appointments   Date Time Provider Maddie Ponce   2021 12:30 PM DO INGA De Souza [facility]       CONNOR Hernandez, RN  Care Transition Coordinator  Contact Number:  [phone]

## 2021-04-20 ENCOUNTER — TELEPHONE (OUTPATIENT)
Dept: BARIATRICS/WEIGHT MGMT | Age: 36
End: 2021-04-20

## 2021-04-20 NOTE — TELEPHONE ENCOUNTER
Surgery Type: sleeve with hiatal hernia repair and removal of intraperitoneal lymph node     Surgery Date: 4/13/21    Surgeon: Dr. Whitley Tijerina     The patient was contacted to follow up on their recent bariatric surgery. The following topics were reviewed:    [x] Hydration is Adequate  Drank ~ 54 oz yesterday - water, powerade/gatorade zero, 4 oz 1% milk (didn't tolerate)           --Patient is getting at least 48-64 oz of fluids a day, not including protein shakes. []Consuming Adequate Protein          []Consuming 2 protein shakes a day 1 Nectar shake made with water (8 oz)                 []Consuming 60-80 grams of protein a day    [] Food intake is appropriate  [x]Adequately pureeing foods, so that there are no chunks left. Pureed broccoli, grits made with water, applesauce, SF pudding, beans, canned chicken, tuna  [x]Taking in 1-2 oz at a time  2 oz/total   [] Eating 4-6 times a day 1 shake + 2-3 pureed foods   [x] Following the 30-30-30 rule  [x] Reminded patient to keep food diary to bring to their 2 week f/ollow up appointment. [x] Pain relief techniques utilized  [x] Taking pain medication as prescribed  [] Utilizing Lidoderm patches (if prescribed)  []Taking Tylenol instead of prescription pain medication  [x] Wearing abdominal binder  [x] Using ice for incisional pain (not in last 2 days)     [x] Activity is appropriate  [x] Walking 10 minutes out of every hour  [x]Avoiding heavy lifting (>10lbs)  [x] Utilizing their incentive spirometer    [x] Issues with Nausea/Vomiting/Reflux Previously having nausea, currently having heartburn, denies vomiting. [x] Using Zofran PRN for nausea/vomiting (ondansetron)  [x]Taking Prilosec for reflux    [x] Issues with Constipation has not had BM yet  []Tried Colace  []Tried Miralax    Pt meeting fluid goals. Pt encouraged to try lactaid/fairlife milk for better acceptance and to help increase protein intake.  Pt instructed to decrease pureed foods to make sure she can include 2nd protein shake consistently, then may resume pureed foods up to 4 x day, encouraged to focus on high protein options to reach protein goal. Pt advised to avoid high fiber veggies such as broccoli and stick to softer options such as green beans/carrots, etc. Reminded pt she can take colace up to 2 x day for a few days, and then if needed may use miralax 1 x day, but informed pt it is common to take a week to have a BM. Pt started taking vitamins yesterday. Pt encouraged to call office if not able to reach goals; pt felt comfortable with goals discussed. Patient was asked to please fill out hospital survey if she receives one in the mail.

## 2021-04-21 NOTE — DISCHARGE SUMMARY
Patient ID:  Robbin Founds  [id number]  65 y.o.  1985    Admit date: 4/13/2021    Discharge date and time: 4/14/21    Admitting Physician: Brent Meza DO     Discharge Physician: same    Admission Diagnoses: Morbid obesity ([facility] [E66.01]  Hiatal hernia [K44.9]  Morbid obesity with BMI of 40.0-44.9, adult ([facility] [E66.01, Z68.41]    Discharge Diagnoses: same    Admission Condition: fair    Discharged Condition: stable    Indication for Admission: Surgery: Robotic assisted Laparoscopic Sleeve Gastrectomy, IV hydration, monitoring, pain and nausea management    Hospital Course: 37 y.o. female admitted with morbid obesity who underwent Robotic assisted laparoscopic sleeve gastrectomy. Surgery was uneventful and she was admitted to bariatric post-operative surgical floor in stable condition for IV hydration, monitoring and pain and nausea management. The following afternoon the pain was tolerable on po pain medication and was taking adequate po. She was discharged in stable condition. Treatments: IV hydration        Disposition: home    Patient Instructions: Activity: activity as tolerated and no driving while on analgesics  Diet: clear liquids  Wound Care: as directed    Follow-up with Dr. Meg Garcia in two weeks.         Signed:  Susan Casey  4/21/2021  8:59 AM

## 2021-04-28 ENCOUNTER — OFFICE VISIT (OUTPATIENT)
Dept: BARIATRICS/WEIGHT MGMT | Age: 36
End: 2021-04-28

## 2021-04-28 VITALS
HEART RATE: 80 BPM | SYSTOLIC BLOOD PRESSURE: 117 MMHG | WEIGHT: 251.8 LBS | DIASTOLIC BLOOD PRESSURE: 77 MMHG | HEIGHT: 68 IN | RESPIRATION RATE: 16 BRPM | BODY MASS INDEX: 38.16 KG/M2 | OXYGEN SATURATION: 99 %

## 2021-04-28 DIAGNOSIS — Z98.84 STATUS POST LAPAROSCOPIC SLEEVE GASTRECTOMY: Primary | ICD-10-CM

## 2021-04-28 PROCEDURE — 99024 POSTOP FOLLOW-UP VISIT: CPT | Performed by: SURGERY

## 2021-04-28 NOTE — LETTER
[facility]  [address] [facility]  Phone: [phone]  Fax: [phone]    Edwina Storey DO        April 28, 2021     Patient: Stephon Nieves   YOB: 1985   Date of Visit: 4/28/2021       To Whom It May Concern: It is my medical opinion that Stephon Nieves may return to work on Monday May 3rd with the following restrictions: lifting/carrying not to exceed 10 lbs. .for 3 more weeks after that. If you have any questions or concerns, please don't hesitate to call.     Sincerely,        Edwina Storey DO

## 2021-04-28 NOTE — PROGRESS NOTES
Dietary Assessment Note      Vitals:   Vitals:    21 1335   BP: 117/77   Pulse: 80   Resp: 16   SpO2: 99%   Weight: 251 lb 12.8 oz (114.2 kg)   Height: 5' 8\" (1.727 m)    Patient lost 25.2 lbs over past 6 weeks. Total Weight Loss: 31.2 lbs    Labs reviewed:     Results for Stormy Walden (MRN [id number]) as of 2021 13:38   Ref. Range 2021 05:54   Sodium Latest Ref Range: 136 - 145 mmol/L 139   Potassium Latest Ref Range: 3.5 - 5.1 mmol/L 4.4   Chloride Latest Ref Range: 99 - 110 mmol/L 108   CO2 Latest Ref Range: 21 - 32 mmol/L 17 (L)   BUN Latest Ref Range: 7 - 20 mg/dL 4 (L)   Creatinine Latest Ref Range: 0.6 - 1.1 mg/dL 0.7   Anion Gap Latest Ref Range: 3 - 16  14   GFR Non- Latest Ref Range: >60  >60   GFR  Latest Ref Range: >60  >60   Glucose Latest Ref Range: 70 - 99 mg/dL 81   Calcium Latest Ref Range: 8.3 - 10.6 mg/dL 9.1       Protein intake: 60-80 grams/day     Fluid intake: 48-64 oz/day    Multivitamin/mineral intake: 4 fusion     Calcium intake: none    Other: biotin     Exercise: Walking     Nutrition Assessment: 2 week s/p sleeve post-op visit. Has been drinking 3 shakes/day to help meet protein goals. Breakfast: Nectar shake made with water     Snack: grits/oatmeal made with 1% milk OR SF jello OR light and fit yogurt     Lunch: Nectar shake made with water     Snack: chicken/fish with mushrooms with green peppers/onions    Dinner: Nectar shake made with water     Snack: chicken/fish with mushrooms with green peppers/onions    Fluids: Gatorade zero, water, crystal light     Consuming only full liquid/Pureed foods?  Yes     Amount able to eat per sittin oz/sitting     Following  rule: Yes    Food Intolerances/issues: none     Client Concerns: none     Goals:   Start Phase 3 at 3 weeks post op - handout provided and reviewed   Limit to 2 shakes/day   Avoid high fiber veggies   Track protein intake to 60-80 g/pro     Plan: F/U at 6 weeks Myranda Francois

## 2021-04-30 NOTE — PROGRESS NOTES
Cognition and memory are normal.         A/P     Rosemary Villasenor is 37 y.o. female , now with Body mass index is 38.29 kg/m². s/p Sleeve gastrectomy, has lost 25 lbs since last visit, total of 31 lbs weight loss. The patient underwent dietary counseling with registered dietician. I have reviewed, discussed and agree with the dietary plan. Patient is trying hard to keep good dietary and behavior modifications. Patient is monitoring portion sizes, food choices and liquid calories. Patient is trying to exercise regularly. Patient pleased with the surgery outcomes. We discussed how her weight affects her overall health including:  Opal Bailey was seen today for follow-up. Diagnoses and all orders for this visit:    Status post laparoscopic sleeve gastrectomy       and importance of weight loss to alleviate those co morbid conditions. I encouraged the patient to continue exercise and keeping healthy eating habits. Also counseled the patient extensively on post surgery care. RTC in 1 month  Diet and Exercise      Patient advised that its their responsibility to follow up for studies and/or labs ordered today.

## 2021-05-26 ENCOUNTER — OFFICE VISIT (OUTPATIENT)
Dept: BARIATRICS/WEIGHT MGMT | Age: 36
End: 2021-05-26

## 2021-05-26 VITALS — HEIGHT: 68 IN | WEIGHT: 250.2 LBS | BODY MASS INDEX: 37.92 KG/M2

## 2021-05-26 DIAGNOSIS — Z98.84 STATUS POST LAPAROSCOPIC SLEEVE GASTRECTOMY: Primary | ICD-10-CM

## 2021-05-26 PROCEDURE — 99024 POSTOP FOLLOW-UP VISIT: CPT | Performed by: SURGERY

## 2021-05-26 NOTE — PROGRESS NOTES
Dietary Assessment Note      Vitals:   Vitals:    05/26/21 1413   Weight: 250 lb 3.2 oz (113.5 kg)   Height: 5' 8\" (1.727 m)    Patient lost 1.6 lbs over the past 4 weeks. Total Weight Loss: 32.8 lbs    Labs reviewed: labs are reviewed, up to date and normal, no lab studies available for review at time of visit    Protein intake: 60-80 grams/day     Fluid intake: 48-64 oz/day    Multivitamin/mineral intake: yes Fusion 4 per day    Calcium intake: no    Other: n/a    Exercise: walking but none organized    Nutrition Assessment: Brkst is grits or oatmeal; lunch is chix salad OR leftovers from brkst; dinner is turkey or chix, broccoli or carrots. She is drinking 1 protein shake with water.   Pt is keeping food journal.      Amount able to eat per sitting: 3-4oz    Following 30/30/30 rule: yes    Food Intolerances/issues: brussels sprouts    Client Concerns: no real concerns    Goals: advance to Phase 4    Plan: follow up as needed    [place], RD, LD
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
## 2021-07-28 ENCOUNTER — OFFICE VISIT (OUTPATIENT)
Dept: BARIATRICS/WEIGHT MGMT | Age: 36
End: 2021-07-28
Payer: COMMERCIAL

## 2021-07-28 VITALS — WEIGHT: 243.2 LBS | BODY MASS INDEX: 36.86 KG/M2 | HEIGHT: 68 IN

## 2021-07-28 DIAGNOSIS — Z98.84 STATUS POST LAPAROSCOPIC SLEEVE GASTRECTOMY: ICD-10-CM

## 2021-07-28 DIAGNOSIS — E66.01 SEVERE OBESITY (BMI 35.0-35.9 WITH COMORBIDITY) (HCC): Primary | ICD-10-CM

## 2021-07-28 PROCEDURE — 1036F TOBACCO NON-USER: CPT | Performed by: SURGERY

## 2021-07-28 PROCEDURE — G8417 CALC BMI ABV UP PARAM F/U: HCPCS | Performed by: SURGERY

## 2021-07-28 PROCEDURE — G8427 DOCREV CUR MEDS BY ELIG CLIN: HCPCS | Performed by: SURGERY

## 2021-07-28 PROCEDURE — 99213 OFFICE O/P EST LOW 20 MIN: CPT | Performed by: SURGERY

## 2021-07-28 NOTE — PROGRESS NOTES
Dietary Assessment Note      Vitals:   Vitals:    07/28/21 1344   Weight: 243 lb 3.2 oz (110.3 kg)   Height: 5' 8\" (1.727 m)    Patient lost 7 lbs over 2 months. Total Weight Loss: 39.8 lbs    Labs reviewed: no lab studies available for review at time of visit    Protein intake: 60-80 grams/day     Fluid intake: 64oz - water / CL    Multivitamin/mineral intake: 1 MVI    Calcium intake: no    Other: none    Exercise: active at work    Nutrition Assessment: 15 weeks post-op visit. Drinking protein shakes with straw, vomited after. Encouraged pt to avoid straw, reviewed additional protein options. Pt struggling with new eating / drinking pattern now that she is back at work. Review starting day with protein based foods, protein water / shake + snack or meal during work. S - Nectar protein shake with water  B - toasted oats cereal / crackers with PB / HB egg  S - piece of fruit / chix salad + 3 crackers  D - chix + broccoli and sometimes fruit / fish + asparagus  S - yogurt / fruit    Amount able to eat per sitting:      Following 30/30/30 rule: 2oz + 1/4c     Food Intolerances/issues: red sauce - vomiting, ground beef    Client Concerns: Pt has had a few episodes of vomiting, fainted 2x    Goals:   - Continue to track protein to 60-80g daily - can eliminate shake if meeting needs from food  - Avoid straws  - MVI per handouts    Plan: F/U per MD James Mack, RD, LD
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
## 2022-01-19 ENCOUNTER — OFFICE VISIT (OUTPATIENT)
Dept: BARIATRICS/WEIGHT MGMT | Age: 37
End: 2022-01-19
Payer: COMMERCIAL

## 2022-01-19 VITALS — WEIGHT: 238.2 LBS | HEIGHT: 68 IN | BODY MASS INDEX: 36.1 KG/M2

## 2022-01-19 DIAGNOSIS — E66.01 SEVERE OBESITY (BMI 35.0-35.9 WITH COMORBIDITY) (HCC): Primary | ICD-10-CM

## 2022-01-19 DIAGNOSIS — G47.33 OBSTRUCTIVE SLEEP APNEA SYNDROME: ICD-10-CM

## 2022-01-19 DIAGNOSIS — Z98.84 STATUS POST LAPAROSCOPIC SLEEVE GASTRECTOMY: ICD-10-CM

## 2022-01-19 PROCEDURE — G8427 DOCREV CUR MEDS BY ELIG CLIN: HCPCS | Performed by: SURGERY

## 2022-01-19 PROCEDURE — G8484 FLU IMMUNIZE NO ADMIN: HCPCS | Performed by: SURGERY

## 2022-01-19 PROCEDURE — 99213 OFFICE O/P EST LOW 20 MIN: CPT | Performed by: SURGERY

## 2022-01-19 PROCEDURE — G8417 CALC BMI ABV UP PARAM F/U: HCPCS | Performed by: SURGERY

## 2022-01-19 PROCEDURE — 1036F TOBACCO NON-USER: CPT | Performed by: SURGERY

## 2022-01-19 NOTE — PROGRESS NOTES
Dietary Assessment Note      Vitals:   Vitals:    22 1238   Weight: 238 lb 3.2 oz (108 kg)   Height: 5' 8\" (1.727 m)    Patient lost 5 lbs over 6 months. Total Weight Loss: 44.8 lbs    Labs reviewed: no new lab studies available for review at time of visit    Protein intake: 50-80 grams/day     Fluid intake: 80oz    Multivitamin/mineral intake: No    Calcium intake: no    Other: Fusion iron chew    Exercise: pt started back at the gym recently - going 3x week, doing treadmill + weights or swimming    Nutrition Assessment: 9 mo post-op visit.    B - half waffle + half banana  S - crackers + couple slice cheese  L - chix salad + crackers / fish / dry cereal  D - grilled fish or chix + veggie (brussels / broccoli / peas / corn) + half baked potato  S - cookie / ice cream    Amount able to eat per sittin-5oz protein + 1/2c    Following  rule: Yes    Food Intolerances/issues: lettuce / spinach     Client Concerns: none    Goals:   - Track protein to 60-80g daily - focus on protein with each meal / snack  - Choose foods with 10 grams or less of sugar and 5 grams or less of fat - avoid ice cream & cookies  - Continue to follow  - pacing at meal times  - MVI per handout    Handouts: Protein content foods / MVI Alternate SL      Plan: Follow up annually post op and as needed    Ara Kline, RD, LD
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
## 2022-04-13 ENCOUNTER — OFFICE VISIT (OUTPATIENT)
Dept: BARIATRICS/WEIGHT MGMT | Age: 37
End: 2022-04-13
Payer: COMMERCIAL

## 2022-04-13 VITALS
BODY MASS INDEX: 36.4 KG/M2 | HEIGHT: 68 IN | HEART RATE: 98 BPM | SYSTOLIC BLOOD PRESSURE: 129 MMHG | DIASTOLIC BLOOD PRESSURE: 80 MMHG | WEIGHT: 240.2 LBS

## 2022-04-13 DIAGNOSIS — Z98.84 STATUS POST LAPAROSCOPIC SLEEVE GASTRECTOMY: ICD-10-CM

## 2022-04-13 DIAGNOSIS — E66.01 SEVERE OBESITY (BMI 35.0-35.9 WITH COMORBIDITY) (HCC): Primary | ICD-10-CM

## 2022-04-13 PROCEDURE — 1036F TOBACCO NON-USER: CPT | Performed by: SURGERY

## 2022-04-13 PROCEDURE — G8417 CALC BMI ABV UP PARAM F/U: HCPCS | Performed by: SURGERY

## 2022-04-13 PROCEDURE — G8427 DOCREV CUR MEDS BY ELIG CLIN: HCPCS | Performed by: SURGERY

## 2022-04-13 PROCEDURE — 99213 OFFICE O/P EST LOW 20 MIN: CPT | Performed by: SURGERY

## 2022-04-13 NOTE — PROGRESS NOTES
Dietary Assessment Note      Vitals:   Vitals:    22 1228   BP: 129/80   Pulse: 98   Weight: 240 lb 3.2 oz (109 kg)   Height: 5' 8\" (1.727 m)    Patient gained 2 lbs over 3 months. Total Weight Loss: 42.8 lbs    Labs reviewed: labs are reviewed, up to date and normal, no lab studies available for review at time of visit    Protein intake: pt not tracking    Fluid intake: >64 oz/day over 80 oz - water, CL tea packet, decaf coffee     Multivitamin/mineral intake: bariatric fusion capsule with iron     Calcium intake: yes citracal 2 per day    Other:biotin     Exercise: has been less frequent due to going to the gym less    Nutrition Assessment: pt is eating 5-6 times per day. Breakfast: 1 cup grits with 1 cup salmon or chix    Lunch:1 cup chix salad made with lite or vegan garcia/onions/celery with 6 crax    Snack: granola bar or crackers    Dinner: chix / salmon w/ veggie, plus lite yogurt. If rice <1/4 cup    Snack: 10-20 grapes or 1/2 alyx or whole banana    Amount able to eat per sittin cup of protein + 1/2 cup side    Following 30 rule: yes    Food Intolerances/issues: none    Client Concerns: weight gain, change in financial situation, less gym time    Goals: Focus on protein & produce 4 x per day. Suggest using food scale to ensure protein needs are being met.          Yovana Del Castillo RD, LD

## 2022-04-13 NOTE — PATIENT INSTRUCTIONS
Patient received dietary handouts and education. Goals: focus on protein & produce 4 x per day. Suggest using food scale to ensure protein needs are being met.
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
## 2022-05-21 ENCOUNTER — TELEMEDICINE (OUTPATIENT)
Dept: BARIATRICS/WEIGHT MGMT | Age: 37
End: 2022-05-21
Payer: COMMERCIAL

## 2022-05-21 DIAGNOSIS — E66.9 CLASS 2 OBESITY: Primary | ICD-10-CM

## 2022-05-21 DIAGNOSIS — Z98.84 S/P LAPAROSCOPIC SLEEVE GASTRECTOMY: ICD-10-CM

## 2022-05-21 DIAGNOSIS — Z71.3 DIETARY COUNSELING AND SURVEILLANCE: ICD-10-CM

## 2022-05-21 PROCEDURE — 99204 OFFICE O/P NEW MOD 45 MIN: CPT | Performed by: FAMILY MEDICINE

## 2022-05-21 PROCEDURE — G8427 DOCREV CUR MEDS BY ELIG CLIN: HCPCS | Performed by: FAMILY MEDICINE

## 2022-05-21 ASSESSMENT — ENCOUNTER SYMPTOMS
NAUSEA: 0
ABDOMINAL PAIN: 0
EYE PAIN: 0
BLOOD IN STOOL: 0
COUGH: 0
ABDOMINAL DISTENTION: 0
DIARRHEA: 0
APNEA: 0
WHEEZING: 0
CONSTIPATION: 0
SHORTNESS OF BREATH: 0
PHOTOPHOBIA: 0
CHOKING: 0
CHEST TIGHTNESS: 0
VOMITING: 0

## 2022-05-23 ENCOUNTER — TELEPHONE (OUTPATIENT)
Dept: BARIATRICS/WEIGHT MGMT | Age: 37
End: 2022-05-23

## 2022-05-23 NOTE — TELEPHONE ENCOUNTER
----- Message from Alise Otoloe MD sent at 2022 11:08 AM EDT -----  Regardin-Stevie/LC Meal Plan  Please call and email 1200-Stevie/low carb meal plan.  Thank you

## 2022-05-23 NOTE — TELEPHONE ENCOUNTER
Attempted to contact pt. LVM with brief description of LCMP and states would be sent to email on file. Encouraged pt to call office with any question.

## 2022-05-26 ENCOUNTER — HOSPITAL ENCOUNTER (EMERGENCY)
Age: 37
Discharge: HOME | End: 2022-05-26
Attending: EMERGENCY MEDICINE
Payer: COMMERCIAL

## 2022-05-26 VITALS
HEART RATE: 71 BPM | DIASTOLIC BLOOD PRESSURE: 84 MMHG | WEIGHT: 252.3 LBS | OXYGEN SATURATION: 100 % | SYSTOLIC BLOOD PRESSURE: 121 MMHG | RESPIRATION RATE: 16 BRPM | BODY MASS INDEX: 38.36 KG/M2 | TEMPERATURE: 98.1 F

## 2022-05-26 DIAGNOSIS — R51.9 NONINTRACTABLE HEADACHE, UNSPECIFIED CHRONICITY PATTERN, UNSPECIFIED HEADACHE TYPE: Primary | ICD-10-CM

## 2022-05-26 PROCEDURE — 2580000003 HC RX 258: Performed by: EMERGENCY MEDICINE

## 2022-05-26 PROCEDURE — 96375 TX/PRO/DX INJ NEW DRUG ADDON: CPT

## 2022-05-26 PROCEDURE — 96365 THER/PROPH/DIAG IV INF INIT: CPT

## 2022-05-26 PROCEDURE — 6360000002 HC RX W HCPCS: Performed by: EMERGENCY MEDICINE

## 2022-05-26 PROCEDURE — 96366 THER/PROPH/DIAG IV INF ADDON: CPT

## 2022-05-26 PROCEDURE — 99284 EMERGENCY DEPT VISIT MOD MDM: CPT

## 2022-05-26 RX ORDER — DIPHENHYDRAMINE HYDROCHLORIDE 50 MG/ML
12.5 INJECTION INTRAMUSCULAR; INTRAVENOUS ONCE
Status: COMPLETED | OUTPATIENT
Start: 2022-05-26 | End: 2022-05-26

## 2022-05-26 RX ORDER — MAGNESIUM SULFATE IN WATER 40 MG/ML
2000 INJECTION, SOLUTION INTRAVENOUS ONCE
Status: COMPLETED | OUTPATIENT
Start: 2022-05-26 | End: 2022-05-26

## 2022-05-26 RX ORDER — PROCHLORPERAZINE EDISYLATE 5 MG/ML
10 INJECTION INTRAMUSCULAR; INTRAVENOUS ONCE
Status: COMPLETED | OUTPATIENT
Start: 2022-05-26 | End: 2022-05-26

## 2022-05-26 RX ORDER — 0.9 % SODIUM CHLORIDE 0.9 %
1000 INTRAVENOUS SOLUTION INTRAVENOUS ONCE
Status: COMPLETED | OUTPATIENT
Start: 2022-05-26 | End: 2022-05-26

## 2022-05-26 RX ORDER — DEXAMETHASONE SODIUM PHOSPHATE 4 MG/ML
10 INJECTION, SOLUTION INTRA-ARTICULAR; INTRALESIONAL; INTRAMUSCULAR; INTRAVENOUS; SOFT TISSUE ONCE
Status: COMPLETED | OUTPATIENT
Start: 2022-05-26 | End: 2022-05-26

## 2022-05-26 RX ORDER — KETOROLAC TROMETHAMINE 30 MG/ML
15 INJECTION, SOLUTION INTRAMUSCULAR; INTRAVENOUS ONCE
Status: COMPLETED | OUTPATIENT
Start: 2022-05-26 | End: 2022-05-26

## 2022-05-26 RX ADMIN — KETOROLAC TROMETHAMINE 15 MG: 30 INJECTION, SOLUTION INTRAMUSCULAR; INTRAVENOUS at 06:42

## 2022-05-26 RX ADMIN — DEXAMETHASONE SODIUM PHOSPHATE 10 MG: 4 INJECTION, SOLUTION INTRAMUSCULAR; INTRAVENOUS at 08:36

## 2022-05-26 RX ADMIN — SODIUM CHLORIDE 1000 ML: 9 INJECTION, SOLUTION INTRAVENOUS at 06:41

## 2022-05-26 RX ADMIN — DIPHENHYDRAMINE HYDROCHLORIDE 12.5 MG: 50 INJECTION, SOLUTION INTRAMUSCULAR; INTRAVENOUS at 06:42

## 2022-05-26 RX ADMIN — MAGNESIUM SULFATE HEPTAHYDRATE 2000 MG: 2 INJECTION, SOLUTION INTRAVENOUS at 08:42

## 2022-05-26 RX ADMIN — PROCHLORPERAZINE EDISYLATE 10 MG: 5 INJECTION, SOLUTION INTRAMUSCULAR; INTRAVENOUS at 06:42

## 2022-05-26 ASSESSMENT — PAIN SCALES - GENERAL
PAINLEVEL_OUTOF10: 10
PAINLEVEL_OUTOF10: 8
PAINLEVEL_OUTOF10: 8
PAINLEVEL_OUTOF10: 3
PAINLEVEL_OUTOF10: 8

## 2022-05-26 ASSESSMENT — PAIN DESCRIPTION - LOCATION
LOCATION: HEAD
LOCATION: HEAD

## 2022-05-26 ASSESSMENT — PAIN DESCRIPTION - ORIENTATION: ORIENTATION: LEFT

## 2022-05-26 ASSESSMENT — PAIN - FUNCTIONAL ASSESSMENT
PAIN_FUNCTIONAL_ASSESSMENT: 0-10

## 2022-05-26 ASSESSMENT — PAIN DESCRIPTION - PAIN TYPE: TYPE: ACUTE PAIN

## 2022-05-26 ASSESSMENT — ENCOUNTER SYMPTOMS
RESPIRATORY NEGATIVE: 1
EYE REDNESS: 0
GASTROINTESTINAL NEGATIVE: 1
EYE PAIN: 0
PHOTOPHOBIA: 1

## 2022-05-26 ASSESSMENT — PAIN DESCRIPTION - FREQUENCY: FREQUENCY: CONTINUOUS

## 2022-05-26 NOTE — ED PROVIDER NOTES
[address] [facility] [address] ENCOUNTER          ATTENDING PHYSICIAN NOTE       Date of evaluation: 5/26/2022    ADDENDUM:      Care of this patient was assumed from Dr Cricket Rivas. The patient was seen for Headache (past two days)  . The patient's initial evaluation and plan have been discussed with the prior provider who initially evaluated the patient. Nursing Notes, Past Medical Hx, Past Surgical Hx, Social Hx, Allergies, and Family Hx were all reviewed. Two days of HA, low suspicion for [facility], meningitis  Migraine cocktail and reasess  Diagnostic Results     EKG   None     RADIOLOGY:  No orders to display       LABS:   No results found for this visit on 05/26/22. RECENT VITALS:  BP: 125/88, Temp: 98.1 °F (36.7 °C), Heart Rate: 76, Resp: 16, SpO2: 98 %     Procedures       ED Course     The patient was given the following medications:  Orders Placed This Encounter   Medications    prochlorperazine (COMPAZINE) injection 10 mg    ketorolac (TORADOL) injection 15 mg    diphenhydrAMINE (BENADRYL) injection 12.5 mg    0.9 % sodium chloride bolus    dexamethasone (DECADRON) injection 10 mg    magnesium sulfate 2000 mg in 50 mL IVPB premix       CONSULTS:  None    MEDICAL DECISION MAKING / ASSESSMENT / Anibalindira Ceron is a 31 y.o. female who presented today with headache. Signed out to me pending response to Compazine, Toradol Benadryl and fluid bolus. Here after the initial treatment her headache was 8 out of 10 still with some discomfort so she was given Decadron as well as 2 mg of magnesium. On recheck patient was much more comfortable headache nearly subsided with a headache of 3 out of 10. Remains neurologically intact. States she is had these headaches exactly the same in the past last one was 3 years ago was previously diagnosed as migraine.   Here she continues to have no focal neurologic deficit has improvement with medication here and will be discharged in stable condition to follow-up with her PCP and given strict return precautions. Clinical Impression     1.  Nonintractable headache, unspecified chronicity pattern, unspecified headache type        Disposition     PATIENT REFERRED TO:  Eugene Pickering MD  [address]  [id number] [address]  [phone]    In 1 week        DISCHARGE MEDICATIONS:  New Prescriptions    No medications on file       DISPOSITION Decision To Discharge 05/26/2022 10:28:15 Yaritza Shepherd MD  05/26/22 1021

## 2022-05-26 NOTE — ED PROVIDER NOTES
[address] [facility] [address] ENCOUNTER          ATTENDING PHYSICIAN NOTE       Date of evaluation: 2022    Chief Complaint     Headache (past two days)      History of Present Illness     Duncan Aldana is a 31 y.o. female who presents to the emergency department complaining of headache. Patient states she has been having frontal headache for approximately 2 days. She states initially was on the right side and now it is on the left. She describes the pain as gradual in onset becoming more severe. She does note photophobia and phonophobia associate with it. She does note nausea but denies any vomiting. She denies any neck pain or stiffness. She denies any fevers or chills. She denies any numbness, tingling, or weakness in the extremities. She states that she used to get headaches similar to this when she was a teenager but has not had one in several years. She has tried taking Tylenol without improvement of her symptoms. Review of Systems     Review of Systems   Constitutional: Negative. HENT: Negative. Eyes: Positive for photophobia. Negative for pain and redness. Respiratory: Negative. Cardiovascular: Negative. Gastrointestinal: Negative. Genitourinary: Negative. Musculoskeletal: Negative. Neurological: Positive for headaches. All other systems reviewed and are negative. Past Medical, Surgical, Family, and Social History     She has a past medical history of Arthritis, Preoperative clearance, and Sleep apnea. She has a past surgical history that includes  section; Tubal ligation; Upper gastrointestinal endoscopy (N/A, 2020); back surgery; and Sleeve Gastrectomy (N/A, 2021). Her family history includes Sleep Apnea in her maternal grandmother. She reports that she has never smoked. She has never used smokeless tobacco. She reports current alcohol use. She reports that she does not use drugs.     Medications     Previous Medications FLUTICASONE (FLONASE) 50 MCG/ACT NASAL SPRAY    2 sprays by Nasal route daily 2 sprays each nostril daily    LORATADINE (CLARITIN) 10 MG CAPSULE    Take 10 mg by mouth daily    MULTIPLE VITAMIN (MULTIVITAMIN, BARIATRIC FUSION COMPLETE, CHEW TAB)    Take 4 tablets by mouth daily       Allergies     She is allergic to other. Physical Exam     INITIAL VITALS: BP: (!) 134/97, Temp: 98.1 °F (36.7 °C), Heart Rate: 79, Resp: 16, SpO2: 96 %   Physical Exam  Vitals and nursing note reviewed. Constitutional:       General: She is not in acute distress. Comments: Appears uncomfortable. HENT:      Head: Normocephalic and atraumatic. Mouth/Throat:      Mouth: Mucous membranes are moist.      Pharynx: No oropharyngeal exudate. Eyes:      General: No scleral icterus. Extraocular Movements: Extraocular movements intact. Conjunctiva/sclera: Conjunctivae normal.      Pupils: Pupils are equal, round, and reactive to light. Musculoskeletal:      Cervical back: Normal range of motion and neck supple. Neurological:      General: No focal deficit present. Mental Status: She is alert and oriented to person, place, and time. Cranial Nerves: No cranial nerve deficit. Motor: No weakness. Coordination: Coordination normal.         Diagnostic Results     RECENT VITALS:  BP: (!) 134/97,Temp: 98.1 °F (36.7 °C), Heart Rate: 79, Resp: 16, SpO2: 96 %     Procedures     N/A    ED Course     Nursing Notes, Past Medical Hx, Past Surgical Hx, Social Hx,Allergies, and Family Hx were reviewed.     patient was given the following medications:  Orders Placed This Encounter   Medications    prochlorperazine (COMPAZINE) injection 10 mg    ketorolac (TORADOL) injection 15 mg    diphenhydrAMINE (BENADRYL) injection 12.5 mg    0.9 % sodium chloride bolus       CONSULTS:  None    MEDICAL DECISIONMAKING / ASSESSMENT / Sherri Trey is a 31 y.o. female who presents to the emergency department complaining of headache. Patient reports history of headaches when she was a teenager but has not had one in several years. She describes this headache as mild in onset becoming more severe as time is gone on with no associated fevers, chills, nasal congestion, cough, or neck pain or stiffness. On arrival, patient does appear uncomfortable. She is hemodynamically stable. She has no focal neurologic deficits on exam.  I feel that likely the patient's headache is migrainous in nature. I have low suspicion for subarachnoid hemorrhage with it being gradual onset in nature and with no neurologic deficits and I have low suspicion for meningitis without infectious symptoms related to this so I do not feel that a CT scan or lumbar puncture are indicated. Patient was written for Compazine, Toradol, and IV fluids. At this time, care of the patient be turned over to the oncoming provider who complete the patient's work-up and disposition pending response to therapy. Clinical Impression     No diagnosis found. Disposition     PATIENT REFERRED TO:  No follow-up provider specified.     DISCHARGE MEDICATIONS:  New Prescriptions    No medications on file       DISPOSITION         Merry Neil MD  05/26/22 7955

## 2022-06-08 ENCOUNTER — OFFICE VISIT (OUTPATIENT)
Dept: SURGERY | Age: 37
End: 2022-06-08
Payer: COMMERCIAL

## 2022-06-08 VITALS
WEIGHT: 237 LBS | BODY MASS INDEX: 35.92 KG/M2 | TEMPERATURE: 97.9 F | RESPIRATION RATE: 15 BRPM | DIASTOLIC BLOOD PRESSURE: 99 MMHG | OXYGEN SATURATION: 96 % | HEIGHT: 68 IN | SYSTOLIC BLOOD PRESSURE: 163 MMHG | HEART RATE: 93 BPM

## 2022-06-08 DIAGNOSIS — N64.81 BREAST PTOSIS: ICD-10-CM

## 2022-06-08 DIAGNOSIS — M79.3 PANNICULITIS: Primary | ICD-10-CM

## 2022-06-08 PROCEDURE — 99244 OFF/OP CNSLTJ NEW/EST MOD 40: CPT | Performed by: SURGERY

## 2022-06-08 PROCEDURE — 1036F TOBACCO NON-USER: CPT | Performed by: SURGERY

## 2022-06-08 PROCEDURE — G8417 CALC BMI ABV UP PARAM F/U: HCPCS | Performed by: SURGERY

## 2022-06-08 PROCEDURE — G8427 DOCREV CUR MEDS BY ELIG CLIN: HCPCS | Performed by: SURGERY
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
## 2022-06-08 NOTE — Clinical Note
Sweet patient - would benefit from mastopexy, panniculectomy, and tummy tuck. Will work to decrease her weight to get weight stable and then return in 2 months and schedule. Thanks!  NK

## 2022-06-25 ENCOUNTER — TELEMEDICINE (OUTPATIENT)
Dept: BARIATRICS/WEIGHT MGMT | Age: 37
End: 2022-06-25
Payer: COMMERCIAL

## 2022-06-25 DIAGNOSIS — E66.9 CLASS 2 OBESITY: Primary | ICD-10-CM

## 2022-06-25 DIAGNOSIS — Z71.3 DIETARY COUNSELING AND SURVEILLANCE: ICD-10-CM

## 2022-06-25 DIAGNOSIS — Z98.84 S/P LAPAROSCOPIC SLEEVE GASTRECTOMY: ICD-10-CM

## 2022-06-25 PROCEDURE — G8427 DOCREV CUR MEDS BY ELIG CLIN: HCPCS | Performed by: FAMILY MEDICINE

## 2022-06-25 PROCEDURE — 99213 OFFICE O/P EST LOW 20 MIN: CPT | Performed by: FAMILY MEDICINE

## 2022-06-25 ASSESSMENT — ENCOUNTER SYMPTOMS
ABDOMINAL DISTENTION: 0
ABDOMINAL PAIN: 0
WHEEZING: 0
DIARRHEA: 0
EYE PAIN: 0
CHOKING: 0
CONSTIPATION: 0
PHOTOPHOBIA: 0
APNEA: 0
NAUSEA: 0
COUGH: 0
BLOOD IN STOOL: 0
SHORTNESS OF BREATH: 0
VOMITING: 0
CHEST TIGHTNESS: 0

## 2022-06-25 NOTE — PROGRESS NOTES
Patient: Marion Muller                      Encounter Date: 6/25/2022    YOB: 1985               Age: 31 y.o. Chief Complaint   Patient presents with    Weight Management     F/u MWM       Patient identification was verified at the start of the visit. No flowsheet data found. BP Readings from Last 1 Encounters:   06/08/22 (!) 163/99- F/u with PCP       BMI Readings from Last 1 Encounters:   06/08/22 36.04 kg/m²       Pulse Readings from Last 1 Encounters:   06/08/22 93           Wt Readings from Last 3 Encounters:   06/08/22 237 lb (107.5 kg)   05/26/22 252 lb 4.8 oz (114.4 kg)   04/13/22 240 lb 3.2 oz (109 kg)       HPI: 31 y.o. female with a long-standing history of obesity s/p sleeve gastrectomy in 2021 by Dr. Sincere Brice presents today for a virtual video follow-up. She has lost 15 pounds since her last visit in May. Current treatment includes low carb/gian diet. Food recall and assessment reviewed. Making better dietary choices. Happy with weight loss. Motivated to continue losing weight.  Goal weight: <230 pounds prior to proceeding with breast reduction/body contouring    B: Grits   L: Chicken salad  S: Fruit  D: Salad + protein     Diet: []LCHF/Ketogenic [x]Modified low-calorie/low carb diet  []Low-calorie diet          []Maintenance       []Other:                Exercise: []Cardio     []Resistance/strength training     [x]Other: No intentional exercise, but trying to be physically active     Allergies   Allergen Reactions    Other Anaphylaxis     Tomato throat swelling closes up hives          Current Outpatient Medications:     Multiple Vitamin (MULTIVITAMIN, BARIATRIC FUSION COMPLETE, CHEW TAB), Take 4 tablets by mouth daily, Disp: , Rfl:     loratadine (CLARITIN) 10 MG capsule, Take 10 mg by mouth daily, Disp: , Rfl:     fluticasone (FLONASE) 50 MCG/ACT nasal spray, 2 sprays by Nasal route daily 2 sprays each nostril daily, Disp: 1 Bottle, Rfl: 0    Patient Active Problem List Diagnosis    Obstructive sleep apnea syndrome    Hiatal hernia with GERD    Prediabetes    Morbid obesity with BMI of 40.0-44.9, adult ([facility] [address]       Review of Systems   Constitutional: Negative for fatigue. Eyes: Negative for photophobia, pain and visual disturbance. Respiratory: Negative for apnea, cough, choking, chest tightness, shortness of breath and wheezing. Cardiovascular: Negative for chest pain, palpitations and leg swelling. Gastrointestinal: Negative for abdominal distention, abdominal pain, blood in stool, constipation, diarrhea, nausea and vomiting. Endocrine: Negative for cold intolerance and heat intolerance. Musculoskeletal: Negative for arthralgias and myalgias. Skin: Negative for rash. Neurological: Negative for dizziness, tremors, syncope, weakness, numbness and headaches. Psychiatric/Behavioral: Negative for agitation, confusion, decreased concentration, dysphoric mood, hallucinations, sleep disturbance and suicidal ideas. The patient is not nervous/anxious and is not hyperactive. Physical Exam  Constitutional:       Appearance: She is well-developed. HENT:      Head: Normocephalic. Eyes:      Conjunctiva/sclera: Conjunctivae normal.   Abdominal:      General: Abdomen is protuberant. Musculoskeletal:         General: No swelling. Neurological:      Mental Status: She is alert and oriented to person, place, and time. Psychiatric:         Mood and Affect: Mood normal.         Behavior: Behavior normal.         Thought Content:  Thought content normal.         Judgment: Judgment normal.         Admission on 04/13/2021, Discharged on 04/14/2021   Component Date Value Ref Range Status    ABO/Rh 04/13/2021 O POS   Final    Antibody Screen 04/13/2021 NEG   Final    Pregnancy, Urine 04/13/2021 Negative  Detects HCG level >25 MIU/mL Final    Comment: Note:  Always repeat results in question with a serum  quantitative pregnancy test. A serum hCG is positive  2-5 days before the urine hCG test.      Sodium 04/13/2021 138  136 - 145 mmol/L Final    Potassium 04/13/2021 4.3  3.5 - 5.1 mmol/L Final    Chloride 04/13/2021 104  99 - 110 mmol/L Final    CO2 04/13/2021 21  21 - 32 mmol/L Final    Anion Gap 04/13/2021 13  3 - 16 Final    Glucose 04/13/2021 105* 70 - 99 mg/dL Final    BUN 04/13/2021 6* 7 - 20 mg/dL Final    CREATININE 04/13/2021 0.7  0.6 - 1.1 mg/dL Final    GFR Non- 04/13/2021 >60  >60 Final    Comment: >60 mL/min/1.73m2 EGFR, calc. for ages 25 and older using the  MDRD formula (not corrected for weight), is valid for stable  renal function.  GFR  04/13/2021 >60  >60 Final    Comment: Chronic Kidney Disease: less than 60 ml/min/1.73 sq.m. Kidney Failure: less than 15 ml/min/1.73 sq.m. Results valid for patients 18 years and older.  Calcium 04/13/2021 8.9  8.3 - 10.6 mg/dL Final    Phosphorus 04/13/2021 3.2  2.5 - 4.9 mg/dL Final    Albumin 04/13/2021 3.9  3.4 - 5.0 g/dL Final    Sodium 04/14/2021 139  136 - 145 mmol/L Final    Potassium 04/14/2021 4.4  3.5 - 5.1 mmol/L Final    Chloride 04/14/2021 108  99 - 110 mmol/L Final    CO2 04/14/2021 17* 21 - 32 mmol/L Final    Anion Gap 04/14/2021 14  3 - 16 Final    Glucose 04/14/2021 81  70 - 99 mg/dL Final    BUN 04/14/2021 4* 7 - 20 mg/dL Final    CREATININE 04/14/2021 0.7  0.6 - 1.1 mg/dL Final    GFR Non- 04/14/2021 >60  >60 Final    Comment: >60 mL/min/1.73m2 EGFR, calc. for ages 25 and older using the  MDRD formula (not corrected for weight), is valid for stable  renal function.  GFR  04/14/2021 >60  >60 Final    Comment: Chronic Kidney Disease: less than 60 ml/min/1.73 sq.m. Kidney Failure: less than 15 ml/min/1.73 sq.m. Results valid for patients 18 years and older.       Calcium 04/14/2021 9.1  8.3 - 10.6 mg/dL Final    WBC 04/14/2021 15.0* 4.0 - 11.0 K/uL Final    RBC 04/14/2021 4.63  4.00 - 5.20 M/uL Final    Hemoglobin 04/14/2021 12.2  12.0 - 16.0 g/dL Final    Hematocrit 04/14/2021 38.7  36.0 - 48.0 % Final    MCV 04/14/2021 83.5  80.0 - 100.0 fL Final    MCH 04/14/2021 26.4  26.0 - 34.0 pg Final    MCHC 04/14/2021 31.6  31.0 - 36.0 g/dL Final    RDW 04/14/2021 15.8* 12.4 - 15.4 % Final    Platelets 03/58/2736 482* 135 - 450 K/uL Final    MPV 04/14/2021 7.1  5.0 - 10.5 fL Final         Assessment and Plan:  1. Class 2 obesity  Improving, but not at goal.  Continue current management. Increase exercise. F/u 4-6 weeks. 2. Dietary counseling and surveillance  1200-Stevie/low carb meal plan. 3. S/P laparoscopic sleeve gastrectomy  Avoid all carbonated drinks. Choose fluids that are sugar-free. Eat small, but frequent meals including a protein source with each meal. Eat meals over 30 minutes, taking small bites and chewing well. Take MVIs as directed. Nutrition plan: [] LCHF/Ketogenic   [x] Modified low-calorie diet (low carb/low-stevie)               [] Low-calorie diet    []Maintenance       []Other    Exercise: [x]Cardio     []Resistance/strength training                       [x]ACSM recommendations (150 minutes/week in active weight loss)                              Behavior: [x]Motivational interviewing performed    [] Referral for counseling                         [x] Discussed strategies to overcome habits/challenges for focus         [] Stress management   [x] Stimulus control                    [] Sleep hygiene    Reviewed:  [x] Nutrition and the importance of regularprotein intake  [x] Hidden carbohydrate sources  [x] Alcohol use  [x] Tobacco use   [x] Importance of exercise and reducing sedentary time          No orders of the defined types were placed in this encounter. No follow-ups on file. Devi Chavez is a 31 y.o. female being evaluated by a Virtual Visit (video visit) encounter to address concerns as mentioned above.   A caregiver was present when appropriate. Due to this being a TeleHealth encounter (During KJDVL-93 public health emergency), evaluation of the following organ systems was limited: Vitals/Constitutional/EENT/Resp/CV/GI//MS/Neuro/Skin/Heme-Lymph-Imm. Pursuant to the emergency declaration under the [address], [address] [place] [address] and the [employer] Act, this Virtual Visit was conducted with patient's (and/or legal guardian's) consent, to reduce the patient's risk of exposure to COVID-19 and provide necessary medical care. The patient (and/or legal guardian) has also been advised to contact this office for worsening conditions or problems, and seek emergency medical treatment and/or call 911 if deemed necessary. Services were provided through a video synchronous discussion virtually to substitute for in-person clinic visit. Patient and provider were located at their individual homes. --Delfino Cardozo MD on 6/25/2022 at 11:59 AM    An electronic signature was used to authenticate this note.
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
## 2022-11-02 ENCOUNTER — APPOINTMENT (OUTPATIENT)
Dept: GENERAL RADIOLOGY | Age: 37
End: 2022-11-02
Payer: COMMERCIAL

## 2022-11-02 ENCOUNTER — HOSPITAL ENCOUNTER (EMERGENCY)
Age: 37
Discharge: HOME | End: 2022-11-02
Attending: EMERGENCY MEDICINE
Payer: COMMERCIAL

## 2022-11-02 VITALS
DIASTOLIC BLOOD PRESSURE: 103 MMHG | OXYGEN SATURATION: 100 % | HEART RATE: 63 BPM | TEMPERATURE: 98.1 F | WEIGHT: 230 LBS | SYSTOLIC BLOOD PRESSURE: 146 MMHG | BODY MASS INDEX: 34.86 KG/M2 | HEIGHT: 68 IN | RESPIRATION RATE: 21 BRPM

## 2022-11-02 DIAGNOSIS — R07.89 CHEST WALL PAIN: Primary | ICD-10-CM

## 2022-11-02 LAB
ANION GAP SERPL CALCULATED.3IONS-SCNC: 10 MMOL/L (ref 3–16)
BASOPHILS ABSOLUTE: 0.1 K/UL (ref 0–0.2)
BASOPHILS RELATIVE PERCENT: 0.9 %
BUN BLDV-MCNC: 12 MG/DL (ref 7–20)
CALCIUM SERPL-MCNC: 8.8 MG/DL (ref 8.3–10.6)
CHLORIDE BLD-SCNC: 107 MMOL/L (ref 99–110)
CO2: 23 MMOL/L (ref 21–32)
CREAT SERPL-MCNC: 0.7 MG/DL (ref 0.6–1.1)
EOSINOPHILS ABSOLUTE: 0.1 K/UL (ref 0–0.6)
EOSINOPHILS RELATIVE PERCENT: 1.7 %
GFR SERPL CREATININE-BSD FRML MDRD: >60 ML/MIN/{1.73_M2}
GLUCOSE BLD-MCNC: 124 MG/DL (ref 70–99)
HCG QUALITATIVE: NEGATIVE
HCT VFR BLD CALC: 31.3 % (ref 36–48)
HEMOGLOBIN: 9.8 G/DL (ref 12–16)
LYMPHOCYTES ABSOLUTE: 3.5 K/UL (ref 1–5.1)
LYMPHOCYTES RELATIVE PERCENT: 50.8 %
MCH RBC QN AUTO: 23.5 PG (ref 26–34)
MCHC RBC AUTO-ENTMCNC: 31.3 G/DL (ref 31–36)
MCV RBC AUTO: 75.2 FL (ref 80–100)
MONOCYTES ABSOLUTE: 0.7 K/UL (ref 0–1.3)
MONOCYTES RELATIVE PERCENT: 9.7 %
NEUTROPHILS ABSOLUTE: 2.5 K/UL (ref 1.7–7.7)
NEUTROPHILS RELATIVE PERCENT: 36.9 %
PDW BLD-RTO: 17.1 % (ref 12.4–15.4)
PLATELET # BLD: 454 K/UL (ref 135–450)
PMV BLD AUTO: 7.4 FL (ref 5–10.5)
POTASSIUM REFLEX MAGNESIUM: 3.9 MMOL/L (ref 3.5–5.1)
RBC # BLD: 4.17 M/UL (ref 4–5.2)
SODIUM BLD-SCNC: 140 MMOL/L (ref 136–145)
TROPONIN: <0.01 NG/ML
WBC # BLD: 6.9 K/UL (ref 4–11)

## 2022-11-02 PROCEDURE — 84484 ASSAY OF TROPONIN QUANT: CPT

## 2022-11-02 PROCEDURE — 80048 BASIC METABOLIC PNL TOTAL CA: CPT

## 2022-11-02 PROCEDURE — 99284 EMERGENCY DEPT VISIT MOD MDM: CPT

## 2022-11-02 PROCEDURE — 71045 X-RAY EXAM CHEST 1 VIEW: CPT

## 2022-11-02 PROCEDURE — 84703 CHORIONIC GONADOTROPIN ASSAY: CPT

## 2022-11-02 PROCEDURE — 36415 COLL VENOUS BLD VENIPUNCTURE: CPT

## 2022-11-02 PROCEDURE — 6370000000 HC RX 637 (ALT 250 FOR IP): Performed by: EMERGENCY MEDICINE

## 2022-11-02 PROCEDURE — 85025 COMPLETE CBC W/AUTO DIFF WBC: CPT

## 2022-11-02 PROCEDURE — 6360000002 HC RX W HCPCS: Performed by: EMERGENCY MEDICINE

## 2022-11-02 PROCEDURE — 93005 ELECTROCARDIOGRAM TRACING: CPT | Performed by: EMERGENCY MEDICINE

## 2022-11-02 RX ORDER — ACETAMINOPHEN 500 MG
1000 TABLET ORAL ONCE
Status: COMPLETED | OUTPATIENT
Start: 2022-11-02 | End: 2022-11-02

## 2022-11-02 RX ORDER — LIDOCAINE 50 MG/G
1 PATCH TOPICAL DAILY
Qty: 30 PATCH | Refills: 0 | Status: SHIPPED | OUTPATIENT
Start: 2022-11-02

## 2022-11-02 RX ORDER — LIDOCAINE 4 G/G
1 PATCH TOPICAL ONCE
Status: DISCONTINUED | OUTPATIENT
Start: 2022-11-02 | End: 2022-11-03 | Stop reason: HOSPADM

## 2022-11-02 RX ORDER — KETOROLAC TROMETHAMINE 30 MG/ML
15 INJECTION, SOLUTION INTRAMUSCULAR; INTRAVENOUS ONCE
Status: DISCONTINUED | OUTPATIENT
Start: 2022-11-02 | End: 2022-11-02

## 2022-11-02 RX ADMIN — ACETAMINOPHEN 1000 MG: 500 TABLET ORAL at 22:39

## 2022-11-02 ASSESSMENT — PAIN SCALES - GENERAL
PAINLEVEL_OUTOF10: 7
PAINLEVEL_OUTOF10: 7

## 2022-11-02 ASSESSMENT — PAIN DESCRIPTION - DESCRIPTORS: DESCRIPTORS: SHARP;TIGHTNESS

## 2022-11-02 ASSESSMENT — PAIN DESCRIPTION - ORIENTATION: ORIENTATION: LEFT

## 2022-11-02 ASSESSMENT — PAIN DESCRIPTION - FREQUENCY: FREQUENCY: INTERMITTENT

## 2022-11-02 ASSESSMENT — PAIN - FUNCTIONAL ASSESSMENT: PAIN_FUNCTIONAL_ASSESSMENT: 0-10

## 2022-11-02 ASSESSMENT — PAIN DESCRIPTION - LOCATION: LOCATION: ARM;CHEST;BACK

## 2022-11-03 LAB
EKG ATRIAL RATE: 72 BPM
EKG DIAGNOSIS: NORMAL
EKG P AXIS: 61 DEGREES
EKG P-R INTERVAL: 184 MS
EKG Q-T INTERVAL: 406 MS
EKG QRS DURATION: 94 MS
EKG QTC CALCULATION (BAZETT): 444 MS
EKG R AXIS: 49 DEGREES
EKG T AXIS: 42 DEGREES
EKG VENTRICULAR RATE: 72 BPM

## 2022-11-03 NOTE — DISCHARGE INSTRUCTIONS
Continue use Tylenol as needed for pain. Use Lidoderm patches 12 hours on at a time and 12 hours off. Follow-up with your primary care provider for repeat evaluation and further testing if your symptoms continue.

## 2022-11-03 NOTE — ED PROVIDER NOTES
[address] [facility] ENCOUNTER          ATTENDING PHYSICIAN NOTE       Date of evaluation: 11/2/2022    ADDENDUM:      Care of this patient was assumed from Dr. Chalo Jones. The patient was seen for Chest Pain (Pt sent from urgent care for abnormal EKG. Pt states she has been having left sided chest pain into arm for the past 4 days, had fever yesterday. Tested negative for COVID and flu at urgent care today )  . The patient's initial evaluation and plan have been discussed with the prior provider who initially evaluated the patient. Nursing Notes, Past Medical Hx, Past Surgical Hx, Social Hx, Allergies, and Family Hx were all reviewed. Patient is a 57-year-old female who presents complaining of 4 days of left-sided chest and back pain. Patient states that she did have fever yesterday but that is since resolved. She denies any cough or shortness of breath. Patient went to an urgent care where she had negative flu and COVID swabs. She had an EKG performed that she was told was abnormal and was recommended to come to the emergency department. On arrival, patient is hemodynamically stable. She has clear breath sounds and normal heart sounds. She does have reproducible chest wall tenderness. EKG shows normal sinus rhythm with no acute ischemic abnormalities. At time of turnover, laboratory studies and chest x-ray were pending. Diagnostic Results     EKG   Normal sinus rhythm with no acute ischemic abnormalities.     RADIOLOGY:  XR CHEST PORTABLE   Final Result      No acute pulmonary pathology or change from the prior study                      LABS:   Results for orders placed or performed during the hospital encounter of 11/02/22   BMP w/ Reflex to MG   Result Value Ref Range    Sodium 140 136 - 145 mmol/L    Potassium reflex Magnesium 3.9 3.5 - 5.1 mmol/L    Chloride 107 99 - 110 mmol/L    CO2 23 21 - 32 mmol/L    Anion Gap 10 3 - 16    Glucose 124 (H) 70 - 99 mg/dL    BUN 12 7 - 20 mg/dL    Creatinine 0.7 0.6 - 1.1 mg/dL    Est, Glom Filt Rate >60 >60    Calcium 8.8 8.3 - 10.6 mg/dL   CBC with Auto Differential   Result Value Ref Range    WBC 6.9 4.0 - 11.0 K/uL    RBC 4.17 4.00 - 5.20 M/uL    Hemoglobin 9.8 (L) 12.0 - 16.0 g/dL    Hematocrit 31.3 (L) 36.0 - 48.0 %    MCV 75.2 (L) 80.0 - 100.0 fL    MCH 23.5 (L) 26.0 - 34.0 pg    MCHC 31.3 31.0 - 36.0 g/dL    RDW 17.1 (H) 12.4 - 15.4 %    Platelets 438 (H) 297 - 450 K/uL    MPV 7.4 5.0 - 10.5 fL    Neutrophils % 36.9 %    Lymphocytes % 50.8 %    Monocytes % 9.7 %    Eosinophils % 1.7 %    Basophils % 0.9 %    Neutrophils Absolute 2.5 1.7 - 7.7 K/uL    Lymphocytes Absolute 3.5 1.0 - 5.1 K/uL    Monocytes Absolute 0.7 0.0 - 1.3 K/uL    Eosinophils Absolute 0.1 0.0 - 0.6 K/uL    Basophils Absolute 0.1 0.0 - 0.2 K/uL   Troponin   Result Value Ref Range    Troponin <0.01 <0.01 ng/mL   HCG Qualitative, Serum   Result Value Ref Range    hCG Qual Negative Detects HCG level >10 MIU/mL       RECENT VITALS:  BP: (!) 146/103, Temp: 98.1 °F (36.7 °C), Heart Rate: 63, Resp: 21, SpO2: 100 %     Procedures     N/A    ED Course          The patient was given the following medications:  Orders Placed This Encounter   Medications    DISCONTD: ketorolac (TORADOL) injection 15 mg    DISCONTD: lidocaine 4 % external patch 1 patch    acetaminophen (TYLENOL) tablet 1,000 mg    lidocaine (LIDODERM) 5 %     Sig: Place 1 patch onto the skin daily 12 hours on, 12 hours off. Dispense:  30 patch     Refill:  0       CONSULTS:  None    MEDICAL DECISION MAKING / ASSESSMENT / Derrill Cancer is a 30 y.o. female with no significant medical problems presents complaining of chest pain. Patient notes pain to the left side of the chest as well as left thoracic back. She does have reproducible tenderness on exam with clear breath sounds normal heart sounds. EKG at urgent care was reportedly abnormal but she has no significant abnormalities on EKG here.   Laboratory studies are unremarkable. Chest x-ray was negative. Patient was given Tylenol and a Lidoderm patch with improvement of symptoms. I feel most likely the patient's symptoms are due to musculoskeletal strain. She may have an upper respiratory tract infection concomitant with this. She has no evidence of bacterial infection so I do not feel antibiotics are indicated. She had negative flu and COVID swabs at urgent care so I do not feel these need to be repeated. Patient be treated symptomatically and will be instructed to follow-up with her primary care provider if symptoms continue. Clinical Impression     1.  Chest wall pain        Disposition     PATIENT REFERRED TO:  Christi Sena MD  [address] [place], [place] [id number] [address]  [phone]          DISCHARGE MEDICATIONS:  Discharge Medication List as of 11/2/2022 11:44 PM        START taking these medications    Details   lidocaine (LIDODERM) 5 % Place 1 patch onto the skin daily 12 hours on, 12 hours off., Disp-30 patch, R-0Normal             DISPOSITION Decision To Discharge 11/02/2022 11:32:46 PM         Won Pierre MD  11/03/22 [id number]

## 2022-11-03 NOTE — ED PROVIDER NOTES
[address] [facility]          ATTENDING PHYSICIAN NOTE       Date of evaluation: 2022    Chief Complaint     Chest Pain (Pt sent from urgent care for abnormal EKG. Pt states she has been having left sided chest pain into arm for the past 4 days, had fever yesterday. Tested negative for COVID and flu at urgent care today )      History of Present Illness     Ulysses Haley is a 30 y.o. female who presents to the emergency room today complaining of a left-sided thoracic back pain. Patient states that this is been bothering her off and on for the last 4 days, states that it is very brief in nature lasting seconds to perhaps a minute before it resolves spontaneously. States it starts in her back and radiates through to her chest is worse with breathing. Patient reports no cough congestion or upper respiratory symptoms. She states she had a fever subjectively yesterday. Patient has a history of gastric sleeve otherwise is healthy. Reports no recent travel reports no lower extremity swelling no family history of coagulation problems or blood clotting disorders. Patient is not a smoker no family history of coronary artery disease at young age. Review of Systems     Review of Systems  All systems were reviewed with the patient/family and negative except as otherwise documented in the HPI. Past Medical, Surgical, Family, and Social History     She has a past medical history of Arthritis, Preoperative clearance, and Sleep apnea. She has a past surgical history that includes  section; Tubal ligation; Upper gastrointestinal endoscopy (N/A, 2020); back surgery; and Sleeve Gastrectomy (N/A, 2021). Her family history includes Sleep Apnea in her maternal grandmother. She reports that she has never smoked. She has never used smokeless tobacco. She reports current alcohol use. She reports that she does not use drugs.     Medications     Discharge Medication List as of 11/2/2022 11:44 PM        CONTINUE these medications which have NOT CHANGED    Details   Multiple Vitamin (MULTIVITAMIN, BARIATRIC FUSION COMPLETE, CHEW TAB) Take 4 tablets by mouth dailyHistorical Med      loratadine (CLARITIN) 10 MG capsule Take 10 mg by mouth dailyHistorical Med      fluticasone (FLONASE) 50 MCG/ACT nasal spray 2 sprays by Nasal route daily 2 sprays each nostril daily, Disp-1 Bottle, R-0Print             Allergies     She is allergic to other. Physical Exam     INITIAL VITALS: BP: (!) 148/98, Temp: 98.1 °F (36.7 °C), Heart Rate: 75, Resp: 13, SpO2: 100 %   Physical Exam  Constitutional: Well-nourished appearing young female sitting up in the hospital stretcher comfortable and in no acute distress  Eyes:  Sclera anicteric. HEENT:  Normocephalic, oropharynx moist.   Neck: normal range of motion, supple. Respiratory:  Even and non-labored, no distress, clear to auscultation bilaterally, no anterior chest wall tenderness to palpation there is some reproducible pain with palpation over the left scapula  Cardiovascular:  Extremities warm, well perfused, no audible murmurs rubs or gallops equal pulses in all extremities. GI:  Soft, nondistended   Musculoskeletal:  No edema, no deformities. Skin:  No rash, no lesions, no pallor   Neurologic:  Awake and alert. Moving all extremities purposefully and with grossly normal coordination. Psychiatric:  Normal mood. Behavior appropriate.       Diagnostic Results     EKG   Normal sinus rhythm, normal axis, normal intervals, no acute ischemic appearing changes      RADIOLOGY:  XR CHEST PORTABLE   Final Result      No acute pulmonary pathology or change from the prior study                      LABS:   Results for orders placed or performed during the hospital encounter of 11/02/22   BMP w/ Reflex to MG   Result Value Ref Range    Sodium 140 136 - 145 mmol/L    Potassium reflex Magnesium 3.9 3.5 - 5.1 mmol/L    Chloride 107 99 - 110 mmol/L CO2 23 21 - 32 mmol/L    Anion Gap 10 3 - 16    Glucose 124 (H) 70 - 99 mg/dL    BUN 12 7 - 20 mg/dL    Creatinine 0.7 0.6 - 1.1 mg/dL    Est, Glom Filt Rate >60 >60    Calcium 8.8 8.3 - 10.6 mg/dL   CBC with Auto Differential   Result Value Ref Range    WBC 6.9 4.0 - 11.0 K/uL    RBC 4.17 4.00 - 5.20 M/uL    Hemoglobin 9.8 (L) 12.0 - 16.0 g/dL    Hematocrit 31.3 (L) 36.0 - 48.0 %    MCV 75.2 (L) 80.0 - 100.0 fL    MCH 23.5 (L) 26.0 - 34.0 pg    MCHC 31.3 31.0 - 36.0 g/dL    RDW 17.1 (H) 12.4 - 15.4 %    Platelets 814 (H) 931 - 450 K/uL    MPV 7.4 5.0 - 10.5 fL    Neutrophils % 36.9 %    Lymphocytes % 50.8 %    Monocytes % 9.7 %    Eosinophils % 1.7 %    Basophils % 0.9 %    Neutrophils Absolute 2.5 1.7 - 7.7 K/uL    Lymphocytes Absolute 3.5 1.0 - 5.1 K/uL    Monocytes Absolute 0.7 0.0 - 1.3 K/uL    Eosinophils Absolute 0.1 0.0 - 0.6 K/uL    Basophils Absolute 0.1 0.0 - 0.2 K/uL   Troponin   Result Value Ref Range    Troponin <0.01 <0.01 ng/mL   HCG Qualitative, Serum   Result Value Ref Range    hCG Qual Negative Detects HCG level >10 MIU/mL   EKG 12 Lead   Result Value Ref Range    Ventricular Rate 72 BPM    Atrial Rate 72 BPM    P-R Interval 184 ms    QRS Duration 94 ms    Q-T Interval 406 ms    QTc Calculation (Bazett) 444 ms    P Axis 61 degrees    R Axis 49 degrees    T Axis 42 degrees    Diagnosis       EKG performed in ER and to be interpreted by ER physician. Confirmed by MD, ER ([id number]),  Nicole Mcneil ([id number]) on 11/3/2022 9:01:01 AM         RECENT VITALS:  BP: (!) 146/103,Temp: 98.1 °F (36.7 °C), Heart Rate: 63, Resp: 21, SpO2: 100 %     Procedures         ED Course     Nursing Notes, Past Medical Hx, Past Surgical Hx, Social Hx,Allergies, and Family Hx were reviewed.     patient was given the following medications:  Orders Placed This Encounter   Medications    DISCONTD: ketorolac (TORADOL) injection 15 mg    DISCONTD: lidocaine 4 % external patch 1 patch    acetaminophen (TYLENOL) tablet 1,000 mg lidocaine (LIDODERM) 5 %     Sig: Place 1 patch onto the skin daily 12 hours on, 12 hours off. Dispense:  30 patch     Refill:  0       CONSULTS:  None    MEDICAL DECISIONMAKING / ASSESSMENT / Myrandane Meckel is a 30 y.o. female who presents to the emergency room today complaining of left-sided thoracic back pain. On arrival the patient is well-appearing her vital signs are unremarkable, she has reproducible pain with palpation of the left scapula suggesting more of a musculoskeletal etiology of her pain, there is no obvious inciting injury and so other diagnoses were entertained including ACS myocarditis pneumothorax was not. Patient is [facility] low risk she is negative by the PE rule out criteria no further work-up indicated at this time, EKG is completely reassuring there was report of an abnormal EKG at an urgent care but patient not bring this with her this not available for my review and she was not told anything specifically about what was abnormal on her EKG there. Patient was given Tylenol and a Lidoderm patch for discomfort chest x-ray was obtained and is unremarkable she was signed out to the oncoming provider awaiting remainder of some screening lab work and anticipated discharge home should this be reassuring. .      Clinical Impression     1.  Chest wall pain        Disposition     PATIENT REFERRED TO:  Apple Young MD  [address] [place], [place] [id number] [place] [id number]  [phone]          DISCHARGE MEDICATIONS:  Discharge Medication List as of 11/2/2022 11:44 PM        START taking these medications    Details   lidocaine (LIDODERM) 5 % Place 1 patch onto the skin daily 12 hours on, 12 hours off., Disp-30 patch, R-0Normal             DISPOSITION Decision To Discharge 11/02/2022 11:32:46 PM         Kenneth Champion MD  11/03/22 [id number]

## 2022-11-03 NOTE — ED TRIAGE NOTES
Pt sent from urgent care for abnormal EKG. Pt states she has been having left sided chest pain into arm for the past 4 days, had fever yesterday.  Tested negative for COVID and flu at urgent care today

## 2022-11-15 ENCOUNTER — APPOINTMENT (OUTPATIENT)
Dept: CT IMAGING | Age: 37
DRG: 463 | End: 2022-11-15
Payer: COMMERCIAL

## 2022-11-15 ENCOUNTER — HOSPITAL ENCOUNTER (INPATIENT)
Age: 37
LOS: 2 days | Discharge: HOME | DRG: 463 | End: 2022-11-18
Attending: EMERGENCY MEDICINE | Admitting: INTERNAL MEDICINE
Payer: COMMERCIAL

## 2022-11-15 DIAGNOSIS — N12 PYELONEPHRITIS: ICD-10-CM

## 2022-11-15 DIAGNOSIS — N10 ACUTE PYELONEPHRITIS: Primary | ICD-10-CM

## 2022-11-15 LAB
ALBUMIN SERPL-MCNC: 4.1 G/DL (ref 3.4–5)
ALP BLD-CCNC: 107 U/L (ref 40–129)
ALT SERPL-CCNC: 9 U/L (ref 10–40)
ANION GAP SERPL CALCULATED.3IONS-SCNC: 11 MMOL/L (ref 3–16)
AST SERPL-CCNC: 13 U/L (ref 15–37)
BASOPHILS ABSOLUTE: 0.1 K/UL (ref 0–0.2)
BASOPHILS RELATIVE PERCENT: 0.9 %
BILIRUB SERPL-MCNC: 0.4 MG/DL (ref 0–1)
BILIRUBIN DIRECT: <0.2 MG/DL (ref 0–0.3)
BILIRUBIN URINE: NEGATIVE
BILIRUBIN, INDIRECT: ABNORMAL MG/DL (ref 0–1)
BLOOD, URINE: ABNORMAL
BUN BLDV-MCNC: 7 MG/DL (ref 7–20)
CALCIUM SERPL-MCNC: 9.5 MG/DL (ref 8.3–10.6)
CHLORIDE BLD-SCNC: 103 MMOL/L (ref 99–110)
CLARITY: CLEAR
CO2: 24 MMOL/L (ref 21–32)
COLOR: YELLOW
CREAT SERPL-MCNC: 0.7 MG/DL (ref 0.6–1.1)
EOSINOPHILS ABSOLUTE: 0.1 K/UL (ref 0–0.6)
EOSINOPHILS RELATIVE PERCENT: 0.6 %
GFR SERPL CREATININE-BSD FRML MDRD: >60 ML/MIN/{1.73_M2}
GLUCOSE BLD-MCNC: 93 MG/DL (ref 70–99)
GLUCOSE URINE: NEGATIVE MG/DL
HCG(URINE) PREGNANCY TEST: NEGATIVE
HCT VFR BLD CALC: 34.4 % (ref 36–48)
HEMOGLOBIN: 11.1 G/DL (ref 12–16)
KETONES, URINE: NEGATIVE MG/DL
LEUKOCYTE ESTERASE, URINE: ABNORMAL
LYMPHOCYTES ABSOLUTE: 2.2 K/UL (ref 1–5.1)
LYMPHOCYTES RELATIVE PERCENT: 20.6 %
MCH RBC QN AUTO: 23.8 PG (ref 26–34)
MCHC RBC AUTO-ENTMCNC: 32.2 G/DL (ref 31–36)
MCV RBC AUTO: 74.1 FL (ref 80–100)
MICROSCOPIC EXAMINATION: YES
MONOCYTES ABSOLUTE: 0.3 K/UL (ref 0–1.3)
MONOCYTES RELATIVE PERCENT: 2.4 %
NEUTROPHILS ABSOLUTE: 8 K/UL (ref 1.7–7.7)
NEUTROPHILS RELATIVE PERCENT: 75.5 %
NITRITE, URINE: POSITIVE
PDW BLD-RTO: 17.5 % (ref 12.4–15.4)
PH UA: 6.5 (ref 5–8)
PLATELET # BLD: 501 K/UL (ref 135–450)
PMV BLD AUTO: 6.8 FL (ref 5–10.5)
POTASSIUM REFLEX MAGNESIUM: 4.2 MMOL/L (ref 3.5–5.1)
PROTEIN UA: 100 MG/DL
RBC # BLD: 4.65 M/UL (ref 4–5.2)
RBC UA: ABNORMAL /HPF (ref 0–4)
SODIUM BLD-SCNC: 138 MMOL/L (ref 136–145)
SPECIFIC GRAVITY UA: 1.02 (ref 1–1.03)
TOTAL PROTEIN: 7.7 G/DL (ref 6.4–8.2)
URINE REFLEX TO CULTURE: ABNORMAL
URINE TYPE: ABNORMAL
UROBILINOGEN, URINE: 1 E.U./DL
WBC # BLD: 10.6 K/UL (ref 4–11)
WBC UA: ABNORMAL /HPF (ref 0–5)

## 2022-11-15 PROCEDURE — G0378 HOSPITAL OBSERVATION PER HR: HCPCS

## 2022-11-15 PROCEDURE — 99285 EMERGENCY DEPT VISIT HI MDM: CPT

## 2022-11-15 PROCEDURE — 6370000000 HC RX 637 (ALT 250 FOR IP): Performed by: INTERNAL MEDICINE

## 2022-11-15 PROCEDURE — 80076 HEPATIC FUNCTION PANEL: CPT

## 2022-11-15 PROCEDURE — 80048 BASIC METABOLIC PNL TOTAL CA: CPT

## 2022-11-15 PROCEDURE — 36415 COLL VENOUS BLD VENIPUNCTURE: CPT

## 2022-11-15 PROCEDURE — 2580000003 HC RX 258: Performed by: INTERNAL MEDICINE

## 2022-11-15 PROCEDURE — 87086 URINE CULTURE/COLONY COUNT: CPT

## 2022-11-15 PROCEDURE — 6360000004 HC RX CONTRAST MEDICATION: Performed by: STUDENT IN AN ORGANIZED HEALTH CARE EDUCATION/TRAINING PROGRAM

## 2022-11-15 PROCEDURE — 2580000003 HC RX 258: Performed by: STUDENT IN AN ORGANIZED HEALTH CARE EDUCATION/TRAINING PROGRAM

## 2022-11-15 PROCEDURE — 96375 TX/PRO/DX INJ NEW DRUG ADDON: CPT

## 2022-11-15 PROCEDURE — 84703 CHORIONIC GONADOTROPIN ASSAY: CPT

## 2022-11-15 PROCEDURE — 6360000002 HC RX W HCPCS: Performed by: INTERNAL MEDICINE

## 2022-11-15 PROCEDURE — 96372 THER/PROPH/DIAG INJ SC/IM: CPT

## 2022-11-15 PROCEDURE — 74177 CT ABD & PELVIS W/CONTRAST: CPT

## 2022-11-15 PROCEDURE — 6360000002 HC RX W HCPCS: Performed by: STUDENT IN AN ORGANIZED HEALTH CARE EDUCATION/TRAINING PROGRAM

## 2022-11-15 PROCEDURE — 96376 TX/PRO/DX INJ SAME DRUG ADON: CPT

## 2022-11-15 PROCEDURE — 87186 SC STD MICRODIL/AGAR DIL: CPT

## 2022-11-15 PROCEDURE — 6360000002 HC RX W HCPCS

## 2022-11-15 PROCEDURE — 96361 HYDRATE IV INFUSION ADD-ON: CPT

## 2022-11-15 PROCEDURE — 87088 URINE BACTERIA CULTURE: CPT

## 2022-11-15 PROCEDURE — 96365 THER/PROPH/DIAG IV INF INIT: CPT

## 2022-11-15 PROCEDURE — 87040 BLOOD CULTURE FOR BACTERIA: CPT

## 2022-11-15 PROCEDURE — 81001 URINALYSIS AUTO W/SCOPE: CPT

## 2022-11-15 PROCEDURE — 85025 COMPLETE CBC W/AUTO DIFF WBC: CPT

## 2022-11-15 RX ORDER — ENOXAPARIN SODIUM 100 MG/ML
30 INJECTION SUBCUTANEOUS 2 TIMES DAILY
Status: DISCONTINUED | OUTPATIENT
Start: 2022-11-15 | End: 2022-11-18 | Stop reason: HOSPADM

## 2022-11-15 RX ORDER — SODIUM CHLORIDE 9 MG/ML
INJECTION, SOLUTION INTRAVENOUS PRN
Status: DISCONTINUED | OUTPATIENT
Start: 2022-11-15 | End: 2022-11-18 | Stop reason: HOSPADM

## 2022-11-15 RX ORDER — ONDANSETRON 2 MG/ML
4 INJECTION INTRAMUSCULAR; INTRAVENOUS ONCE
Status: COMPLETED | OUTPATIENT
Start: 2022-11-15 | End: 2022-11-15

## 2022-11-15 RX ORDER — SODIUM CHLORIDE 9 MG/ML
INJECTION, SOLUTION INTRAVENOUS CONTINUOUS
Status: DISCONTINUED | OUTPATIENT
Start: 2022-11-15 | End: 2022-11-18 | Stop reason: HOSPADM

## 2022-11-15 RX ORDER — ACETAMINOPHEN 325 MG/1
650 TABLET ORAL EVERY 6 HOURS PRN
Status: DISCONTINUED | OUTPATIENT
Start: 2022-11-15 | End: 2022-11-18 | Stop reason: HOSPADM

## 2022-11-15 RX ORDER — SODIUM CHLORIDE 0.9 % (FLUSH) 0.9 %
5-40 SYRINGE (ML) INJECTION EVERY 12 HOURS SCHEDULED
Status: DISCONTINUED | OUTPATIENT
Start: 2022-11-15 | End: 2022-11-18 | Stop reason: HOSPADM

## 2022-11-15 RX ORDER — LIDOCAINE 4 G/G
1 PATCH TOPICAL DAILY
Status: DISCONTINUED | OUTPATIENT
Start: 2022-11-15 | End: 2022-11-18 | Stop reason: HOSPADM

## 2022-11-15 RX ORDER — ACETAMINOPHEN 650 MG/1
650 SUPPOSITORY RECTAL EVERY 6 HOURS PRN
Status: DISCONTINUED | OUTPATIENT
Start: 2022-11-15 | End: 2022-11-18 | Stop reason: HOSPADM

## 2022-11-15 RX ORDER — KETOROLAC TROMETHAMINE 30 MG/ML
15 INJECTION, SOLUTION INTRAMUSCULAR; INTRAVENOUS ONCE
Status: COMPLETED | OUTPATIENT
Start: 2022-11-15 | End: 2022-11-15

## 2022-11-15 RX ORDER — POLYETHYLENE GLYCOL 3350 17 G/17G
17 POWDER, FOR SOLUTION ORAL DAILY PRN
Status: DISCONTINUED | OUTPATIENT
Start: 2022-11-15 | End: 2022-11-18 | Stop reason: HOSPADM

## 2022-11-15 RX ORDER — DROPERIDOL 2.5 MG/ML
1.25 INJECTION, SOLUTION INTRAMUSCULAR; INTRAVENOUS EVERY 6 HOURS PRN
Status: DISCONTINUED | OUTPATIENT
Start: 2022-11-15 | End: 2022-11-18 | Stop reason: HOSPADM

## 2022-11-15 RX ORDER — SODIUM CHLORIDE 0.9 % (FLUSH) 0.9 %
5-40 SYRINGE (ML) INJECTION PRN
Status: DISCONTINUED | OUTPATIENT
Start: 2022-11-15 | End: 2022-11-18 | Stop reason: HOSPADM

## 2022-11-15 RX ORDER — OXYCODONE HYDROCHLORIDE AND ACETAMINOPHEN 5; 325 MG/1; MG/1
1 TABLET ORAL EVERY 4 HOURS PRN
Status: DISCONTINUED | OUTPATIENT
Start: 2022-11-15 | End: 2022-11-18 | Stop reason: HOSPADM

## 2022-11-15 RX ORDER — ONDANSETRON 4 MG/1
4 TABLET, ORALLY DISINTEGRATING ORAL EVERY 8 HOURS PRN
Status: DISCONTINUED | OUTPATIENT
Start: 2022-11-15 | End: 2022-11-18 | Stop reason: HOSPADM

## 2022-11-15 RX ORDER — ONDANSETRON 2 MG/ML
4 INJECTION INTRAMUSCULAR; INTRAVENOUS EVERY 6 HOURS PRN
Status: DISCONTINUED | OUTPATIENT
Start: 2022-11-15 | End: 2022-11-18 | Stop reason: HOSPADM

## 2022-11-15 RX ORDER — MORPHINE SULFATE 2 MG/ML
2 INJECTION, SOLUTION INTRAMUSCULAR; INTRAVENOUS EVERY 4 HOURS PRN
Status: DISPENSED | OUTPATIENT
Start: 2022-11-15 | End: 2022-11-17

## 2022-11-15 RX ORDER — SODIUM CHLORIDE, SODIUM LACTATE, POTASSIUM CHLORIDE, AND CALCIUM CHLORIDE .6; .31; .03; .02 G/100ML; G/100ML; G/100ML; G/100ML
1000 INJECTION, SOLUTION INTRAVENOUS ONCE
Status: COMPLETED | OUTPATIENT
Start: 2022-11-15 | End: 2022-11-15

## 2022-11-15 RX ADMIN — DROPERIDOL 1.25 MG: 2.5 INJECTION, SOLUTION INTRAMUSCULAR; INTRAVENOUS at 21:43

## 2022-11-15 RX ADMIN — SODIUM CHLORIDE: 9 INJECTION, SOLUTION INTRAVENOUS at 22:35

## 2022-11-15 RX ADMIN — ENOXAPARIN SODIUM 30 MG: 100 INJECTION SUBCUTANEOUS at 22:32

## 2022-11-15 RX ADMIN — IOPAMIDOL 75 ML: 755 INJECTION, SOLUTION INTRAVENOUS at 16:39

## 2022-11-15 RX ADMIN — HYDROMORPHONE HYDROCHLORIDE 1 MG: 1 INJECTION, SOLUTION INTRAMUSCULAR; INTRAVENOUS; SUBCUTANEOUS at 15:12

## 2022-11-15 RX ADMIN — DROPERIDOL 1.25 MG: 2.5 INJECTION, SOLUTION INTRAMUSCULAR; INTRAVENOUS at 15:49

## 2022-11-15 RX ADMIN — KETOROLAC TROMETHAMINE 15 MG: 30 INJECTION, SOLUTION INTRAMUSCULAR at 14:30

## 2022-11-15 RX ADMIN — SODIUM CHLORIDE, POTASSIUM CHLORIDE, SODIUM LACTATE AND CALCIUM CHLORIDE 1000 ML: 600; 310; 30; 20 INJECTION, SOLUTION INTRAVENOUS at 15:14

## 2022-11-15 RX ADMIN — Medication 1 MG: at 15:12

## 2022-11-15 RX ADMIN — DEXTROSE MONOHYDRATE 1000 MG: 5 INJECTION INTRAVENOUS at 18:00

## 2022-11-15 RX ADMIN — ONDANSETRON 4 MG: 2 INJECTION INTRAMUSCULAR; INTRAVENOUS at 14:30

## 2022-11-15 RX ADMIN — OXYCODONE AND ACETAMINOPHEN 1 TABLET: 5; 325 TABLET ORAL at 22:38

## 2022-11-15 ASSESSMENT — PAIN - FUNCTIONAL ASSESSMENT
PAIN_FUNCTIONAL_ASSESSMENT: 0-10
PAIN_FUNCTIONAL_ASSESSMENT: PREVENTS OR INTERFERES SOME ACTIVE ACTIVITIES AND ADLS

## 2022-11-15 ASSESSMENT — PAIN SCALES - GENERAL
PAINLEVEL_OUTOF10: 6
PAINLEVEL_OUTOF10: 10
PAINLEVEL_OUTOF10: 6
PAINLEVEL_OUTOF10: 10

## 2022-11-15 ASSESSMENT — PAIN DESCRIPTION - ONSET: ONSET: ON-GOING

## 2022-11-15 ASSESSMENT — PAIN DESCRIPTION - DESCRIPTORS
DESCRIPTORS: ACHING;NAGGING
DESCRIPTORS: DISCOMFORT

## 2022-11-15 ASSESSMENT — PAIN DESCRIPTION - LOCATION
LOCATION: FLANK
LOCATION: BACK

## 2022-11-15 ASSESSMENT — PAIN DESCRIPTION - ORIENTATION
ORIENTATION: RIGHT;LEFT
ORIENTATION: RIGHT;LEFT;UPPER

## 2022-11-15 ASSESSMENT — PAIN DESCRIPTION - PAIN TYPE: TYPE: ACUTE PAIN

## 2022-11-15 ASSESSMENT — PAIN DESCRIPTION - FREQUENCY: FREQUENCY: CONTINUOUS

## 2022-11-15 NOTE — ED PROVIDER NOTES
ED Attending Attestation Note     Date of evaluation: 11/15/2022    This patient was seen by the resident. I have seen and examined the patient, agree with the workup, evaluation, management and diagnosis. The care plan has been discussed. My assessment reveals an overall well-developed young woman, in severe obvious discomfort, although in no acute respiratory distress. She presents to the emergency department with concerns for potential kidney infection, she has been experiencing urinary frequency and dysuria, as well as bilateral flank pain over approximately the last week. Examination, she has no focal midline spinal tenderness to palpation, but does have tenderness to palpation in the bilateral lower thoracic regions, with pain that is constant regardless of position, but is worse with changes in position. She additionally has mid to lower abdominal discomfort to palpation, although without guarding or rebound tenderness. She is noted to have a mildly elevated temperature in the emergency department, and is covered in blankets stating that she feels cold at the time of my examination. Her urine is not entirely normal, nitrite positive a small leukocyte esterase, but has only 6-9 white blood cells no bacteria or red on microscopy. The patient's pain has been very difficult to control in the emergency department, and was not markedly improved at the time of my examination even after fluids, Toradol, Lidoderm patch, Zofran, Dilaudid, and droperidol. Given the patient's significant pain, difficult to control in the emergency department, with elevated temperature, and unimpressive abnormalities on urinalysis, as well as her surgical history, a CT of the abdomen and pelvis is performed to evaluate for more concerning intra-abdominal causes of her symptoms.        Bhupinder Chandra MD  11/15/22 0139

## 2022-11-15 NOTE — ED PROVIDER NOTES
[address] ENCOUNTER          EM RESIDENT NOTE       Date of evaluation: 11/15/2022    Chief Complaint     Flank Pain (Both sides x1 week) and Dysuria      of Present Illness     Glenda Ricketts is a 30 y.o. female with past medical history of cystitis who presents to the emergency department with dysuria, increased urinary urgency and frequency, and low back pain. Patient reports her symptoms started approximately 1 week ago. She has been managing her low back pain with heating pads and hot showers but reports the pain has become progressively worse and is constant. It is in the mid to low back area, not worse on one side or the other. Reports chills but no fevers. Nausea without emesis. No diarrhea or constipation. Denies vaginal discharge. Last menstrual period was 10/28; denies vaginal discharge, history of sexually transmitted infections. She is sexually active with 1 partner. Review of Systems     Please see HPI for pertinent positives and negatives. All other systems reviewed and negative. Past Medical, Surgical, Family, and Social History     She has a past medical history of Arthritis, Preoperative clearance, and Sleep apnea. She has a past surgical history that includes  section; Tubal ligation; Upper gastrointestinal endoscopy (N/A, 2020); back surgery; and Sleeve Gastrectomy (N/A, 2021). Her family history includes Sleep Apnea in her maternal grandmother. She reports that she has never smoked. She has never used smokeless tobacco. She reports current alcohol use. She reports that she does not use drugs. Medications     Previous Medications    FLUTICASONE (FLONASE) 50 MCG/ACT NASAL SPRAY    2 sprays by Nasal route daily 2 sprays each nostril daily    LIDOCAINE (LIDODERM) 5 %    Place 1 patch onto the skin daily 12 hours on, 12 hours off.     LORATADINE (CLARITIN) 10 MG CAPSULE    Take 10 mg by mouth daily    MULTIPLE VITAMIN (MULTIVITAMIN, BARIATRIC FUSION COMPLETE, CHEW TAB)    Take 4 tablets by mouth daily       Allergies     She is allergic to tomato. Physical Exam     INITIAL VITALS: BP: (!) 152/97, Temp: 99.2 °F (37.3 °C), Heart Rate: 89, Resp: 18, SpO2: 100 %   Physical Exam  Constitutional:       Comments: Nontoxic in no acute distress. Appears uncomfortable secondary to pain. HENT:      Head: Normocephalic and atraumatic. Nose: Nose normal.      Mouth/Throat:      Mouth: Mucous membranes are moist.   Eyes:      Extraocular Movements: Extraocular movements intact. Pupils: Pupils are equal, round, and reactive to light. Cardiovascular:      Rate and Rhythm: Normal rate and regular rhythm. Pulses: Normal pulses. Heart sounds: Normal heart sounds. Pulmonary:      Effort: Pulmonary effort is normal. No respiratory distress. Abdominal:      General: There is no distension. Palpations: Abdomen is soft. Tenderness: There is no abdominal tenderness. There is right CVA tenderness and left CVA tenderness. There is no guarding. Musculoskeletal:         General: Normal range of motion. Cervical back: Neck supple. Skin:     General: Skin is warm and dry. Capillary Refill: Capillary refill takes less than 2 seconds. Neurological:      General: No focal deficit present. Mental Status: She is alert. DiagnosticResults     EKG   None performed. RADIOLOGY:  CT ABDOMEN PELVIS W IV CONTRAST Additional Contrast? None   Final Result      1. Urothelial thickening in the bilateral renal collecting systems and ureters greatest on the left with moderate periureteral stranding. Underlying inflammatory or infectious etiology such as acute pyelo-ureteritis is suggested. Correlate clinically. 2.  No other findings for acute intra-abdominal or pelvic abnormality. 3.  Status post gastric bypass.           LABS:   Results for orders placed or performed during the hospital encounter of 11/15/22   BMP w/ Reflex to MG   Result Value Ref Range    Sodium 138 136 - 145 mmol/L    Potassium reflex Magnesium 4.2 3.5 - 5.1 mmol/L    Chloride 103 99 - 110 mmol/L    CO2 24 21 - 32 mmol/L    Anion Gap 11 3 - 16    Glucose 93 70 - 99 mg/dL    BUN 7 7 - 20 mg/dL    Creatinine 0.7 0.6 - 1.1 mg/dL    Est, Glom Filt Rate >60 >60    Calcium 9.5 8.3 - 10.6 mg/dL   CBC with Auto Differential   Result Value Ref Range    WBC 10.6 4.0 - 11.0 K/uL    RBC 4.65 4.00 - 5.20 M/uL    Hemoglobin 11.1 (L) 12.0 - 16.0 g/dL    Hematocrit 34.4 (L) 36.0 - 48.0 %    MCV 74.1 (L) 80.0 - 100.0 fL    MCH 23.8 (L) 26.0 - 34.0 pg    MCHC 32.2 31.0 - 36.0 g/dL    RDW 17.5 (H) 12.4 - 15.4 %    Platelets 727 (H) 717 - 450 K/uL    MPV 6.8 5.0 - 10.5 fL    Neutrophils % 75.5 %    Lymphocytes % 20.6 %    Monocytes % 2.4 %    Eosinophils % 0.6 %    Basophils % 0.9 %    Neutrophils Absolute 8.0 (H) 1.7 - 7.7 K/uL    Lymphocytes Absolute 2.2 1.0 - 5.1 K/uL    Monocytes Absolute 0.3 0.0 - 1.3 K/uL    Eosinophils Absolute 0.1 0.0 - 0.6 K/uL    Basophils Absolute 0.1 0.0 - 0.2 K/uL   Urinalysis with Reflex to Culture    Specimen: Urine   Result Value Ref Range    Color, UA Yellow Straw/Yellow    Clarity, UA Clear Clear    Glucose, Ur Negative Negative mg/dL    Bilirubin Urine Negative Negative    Ketones, Urine Negative Negative mg/dL    Specific Gravity, UA 1.020 1.005 - 1.030    Blood, Urine TRACE-INTACT (A) Negative    pH, UA 6.5 5.0 - 8.0    Protein,  (A) Negative mg/dL    Urobilinogen, Urine 1.0 <2.0 E.U./dL    Nitrite, Urine POSITIVE (A) Negative    Leukocyte Esterase, Urine SMALL (A) Negative    Microscopic Examination YES     Urine Type NotGiven     Urine Reflex to Culture Not Indicated    Hepatic Function Panel   Result Value Ref Range    Total Protein 7.7 6.4 - 8.2 g/dL    Albumin 4.1 3.4 - 5.0 g/dL    Alkaline Phosphatase 107 40 - 129 U/L    ALT 9 (L) 10 - 40 U/L    AST 13 (L) 15 - 37 U/L    Total Bilirubin 0.4 0.0 - 1.0 mg/dL    Bilirubin, Direct <0.2 0.0 - 0.3 mg/dL    Bilirubin, Indirect see below 0.0 - 1.0 mg/dL   Urine Preg (Lab)   Result Value Ref Range    HCG(Urine) Pregnancy Test Negative Detects HCG level >20 MIU/mL   Microscopic Urinalysis   Result Value Ref Range    WBC, UA 6-9 (A) 0 - 5 /HPF    RBC, UA 3-4 0 - 4 /HPF       ED BEDSIDE ULTRASOUND:  No results found. RECENT VITALS:  BP: (!) 152/97, Temp: 99.2 °F (37.3 °C), Heart Rate: 89,Resp: 18, SpO2: 100 %     Procedures     None    ED Course     Nursing Notes, Past Medical Hx, Past Surgical Hx, Social Hx, Allergies, and Family Hx were reviewed. The patient was given the followingmedications:  Orders Placed This Encounter   Medications    lactated ringers bolus    ondansetron (ZOFRAN) injection 4 mg    ketorolac (TORADOL) injection 15 mg    lidocaine 4 % external patch 1 patch    HYDROmorphone (DILAUDID) injection 1 mg    HYDROmorphone (DILAUDID) 1 MG/ML injection     Augusto Han: cabinet override    droperidol (INAPSINE) injection 1.25 mg    iopamidol (ISOVUE-370) 76 % injection 75 mL    cefTRIAXone (ROCEPHIN) 1,000 mg in dextrose 5 % 50 mL IVPB mini-bag     Order Specific Question:   Antimicrobial Indications     Answer:   Intra-Abdominal Infection       CONSULTS:  IP CONSULT TO HOSPITALIST    [facility] / LUIS ENRIQUE / James Kavya is a 30 y.o. female with past medical history of cystitis and pyelonephritis who presents to the emergency department with flank pain, dysuria, urgency. She is afebrile, hemodynamically stable, uncomfortable appearing secondary to pain for which Toradol, Zofran, and fluids are ordered. High suspicion for pyelonephritis given her symptom constellation for which urinalysis and labs are obtained. Nephrolithiasis is also a consideration, however patient has no history of kidney stones to suggest this pathology.   Lower suspicion for ovarian pathology given absence of history of sexually transmitted infections or vaginal discharge. Her symptom course with gradual worsening is less suggestive of ovarian torsion. CBC and renal panel are unrevealing. She has nitrite positivity and 9 white blood cells on her urinalysis for which culture is sent. She continues to have pain refractory to Toradol, and several doses of Dilaudid, therefore CT scan is ordered to assess for other acute pathology. Her CT scan is consistent with pyelonephritis. Disposition with a dose of IV antibiotics and outpatient p.o. antibiotics versus inpatient admission for further pain control and symptom management as discussed with patient; she opts for admission for further symptom control. Patient is given a dose of ceftriaxone. She is admitted in stable condition. This patient was also evaluated by the attending physician. All care plans werediscussed and agreed upon. Clinical Impression     1. Pyelonephritis        Disposition     PATIENT REFERRED TO:  No follow-up provider specified.     DISCHARGE MEDICATIONS:  New Prescriptions    No medications on file       DISPOSITION Decision To Admit 11/15/2022 05:34:41 PM       Belia George MD  Resident  11/15/22 [id number]
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
## 2022-11-15 NOTE — ED TRIAGE NOTES
Patient arrived to ED with complaints of bilateral flank pain x1 week with difficulty urinating. Pain and frequent urination.

## 2022-11-15 NOTE — LETTER
[address] [address] [address] [facility] [address]  Phone: [phone]             November 18, 2022    Patient: Nohemi Willams   YOB: 1985   Date of Visit: 11/15/2022       To Whom It May Concern:    Nohemi Willams was seen and treated in our facility  beginning 11/15/2022 until . She may return to work on 11/21/22.       Sincerely,       Nupur Del Angel MD         Signature:__________________________________

## 2022-11-16 PROBLEM — N10 ACUTE PYELONEPHRITIS: Status: ACTIVE | Noted: 2022-11-16

## 2022-11-16 LAB
ALBUMIN SERPL-MCNC: 3.5 G/DL (ref 3.4–5)
ALP BLD-CCNC: 96 U/L (ref 40–129)
ALT SERPL-CCNC: 7 U/L (ref 10–40)
ANION GAP SERPL CALCULATED.3IONS-SCNC: 9 MMOL/L (ref 3–16)
AST SERPL-CCNC: 9 U/L (ref 15–37)
BASOPHILS ABSOLUTE: 0 K/UL (ref 0–0.2)
BASOPHILS RELATIVE PERCENT: 0.5 %
BILIRUB SERPL-MCNC: 0.5 MG/DL (ref 0–1)
BILIRUBIN DIRECT: <0.2 MG/DL (ref 0–0.3)
BILIRUBIN, INDIRECT: ABNORMAL MG/DL (ref 0–1)
BUN BLDV-MCNC: 6 MG/DL (ref 7–20)
CALCIUM SERPL-MCNC: 8.9 MG/DL (ref 8.3–10.6)
CHLORIDE BLD-SCNC: 105 MMOL/L (ref 99–110)
CO2: 25 MMOL/L (ref 21–32)
CREAT SERPL-MCNC: 0.6 MG/DL (ref 0.6–1.1)
EOSINOPHILS ABSOLUTE: 0.1 K/UL (ref 0–0.6)
EOSINOPHILS RELATIVE PERCENT: 0.6 %
GFR SERPL CREATININE-BSD FRML MDRD: >60 ML/MIN/{1.73_M2}
GLUCOSE BLD-MCNC: 106 MG/DL (ref 70–99)
HCT VFR BLD CALC: 28.6 % (ref 36–48)
HCT VFR BLD CALC: 29.6 % (ref 36–48)
HEMOGLOBIN: 9.3 G/DL (ref 12–16)
HEMOGLOBIN: 9.5 G/DL (ref 12–16)
LYMPHOCYTES ABSOLUTE: 2.1 K/UL (ref 1–5.1)
LYMPHOCYTES RELATIVE PERCENT: 22.8 %
MCH RBC QN AUTO: 23.8 PG (ref 26–34)
MCHC RBC AUTO-ENTMCNC: 32.5 G/DL (ref 31–36)
MCV RBC AUTO: 73.4 FL (ref 80–100)
MONOCYTES ABSOLUTE: 0.9 K/UL (ref 0–1.3)
MONOCYTES RELATIVE PERCENT: 10.1 %
NEUTROPHILS ABSOLUTE: 6.1 K/UL (ref 1.7–7.7)
NEUTROPHILS RELATIVE PERCENT: 66 %
PDW BLD-RTO: 16.9 % (ref 12.4–15.4)
PLATELET # BLD: 439 K/UL (ref 135–450)
PMV BLD AUTO: 6.8 FL (ref 5–10.5)
POTASSIUM REFLEX MAGNESIUM: 3.6 MMOL/L (ref 3.5–5.1)
RBC # BLD: 3.9 M/UL (ref 4–5.2)
SODIUM BLD-SCNC: 139 MMOL/L (ref 136–145)
TOTAL PROTEIN: 6.6 G/DL (ref 6.4–8.2)
WBC # BLD: 9.3 K/UL (ref 4–11)

## 2022-11-16 PROCEDURE — 85014 HEMATOCRIT: CPT

## 2022-11-16 PROCEDURE — G0378 HOSPITAL OBSERVATION PER HR: HCPCS

## 2022-11-16 PROCEDURE — 80076 HEPATIC FUNCTION PANEL: CPT

## 2022-11-16 PROCEDURE — 1200000000 HC SEMI PRIVATE

## 2022-11-16 PROCEDURE — 2580000003 HC RX 258: Performed by: INTERNAL MEDICINE

## 2022-11-16 PROCEDURE — 6360000002 HC RX W HCPCS: Performed by: INTERNAL MEDICINE

## 2022-11-16 PROCEDURE — 96361 HYDRATE IV INFUSION ADD-ON: CPT

## 2022-11-16 PROCEDURE — 96376 TX/PRO/DX INJ SAME DRUG ADON: CPT

## 2022-11-16 PROCEDURE — 6370000000 HC RX 637 (ALT 250 FOR IP): Performed by: INTERNAL MEDICINE

## 2022-11-16 PROCEDURE — 96372 THER/PROPH/DIAG INJ SC/IM: CPT

## 2022-11-16 PROCEDURE — 85018 HEMOGLOBIN: CPT

## 2022-11-16 PROCEDURE — 85025 COMPLETE CBC W/AUTO DIFF WBC: CPT

## 2022-11-16 PROCEDURE — 96375 TX/PRO/DX INJ NEW DRUG ADDON: CPT

## 2022-11-16 PROCEDURE — 36415 COLL VENOUS BLD VENIPUNCTURE: CPT

## 2022-11-16 PROCEDURE — 80048 BASIC METABOLIC PNL TOTAL CA: CPT

## 2022-11-16 RX ADMIN — SODIUM CHLORIDE, PRESERVATIVE FREE 10 ML: 5 INJECTION INTRAVENOUS at 21:00

## 2022-11-16 RX ADMIN — OXYCODONE AND ACETAMINOPHEN 1 TABLET: 5; 325 TABLET ORAL at 22:47

## 2022-11-16 RX ADMIN — MORPHINE SULFATE 2 MG: 2 INJECTION, SOLUTION INTRAMUSCULAR; INTRAVENOUS at 12:08

## 2022-11-16 RX ADMIN — MORPHINE SULFATE 2 MG: 2 INJECTION, SOLUTION INTRAMUSCULAR; INTRAVENOUS at 01:11

## 2022-11-16 RX ADMIN — OXYCODONE AND ACETAMINOPHEN 1 TABLET: 5; 325 TABLET ORAL at 07:52

## 2022-11-16 RX ADMIN — CEFTRIAXONE 1000 MG: 1 INJECTION, POWDER, FOR SOLUTION INTRAMUSCULAR; INTRAVENOUS at 18:05

## 2022-11-16 RX ADMIN — MORPHINE SULFATE 2 MG: 2 INJECTION, SOLUTION INTRAMUSCULAR; INTRAVENOUS at 20:59

## 2022-11-16 RX ADMIN — OXYCODONE AND ACETAMINOPHEN 1 TABLET: 5; 325 TABLET ORAL at 02:56

## 2022-11-16 RX ADMIN — OXYCODONE AND ACETAMINOPHEN 1 TABLET: 5; 325 TABLET ORAL at 14:43

## 2022-11-16 RX ADMIN — ENOXAPARIN SODIUM 30 MG: 100 INJECTION SUBCUTANEOUS at 09:01

## 2022-11-16 RX ADMIN — ONDANSETRON 4 MG: 2 INJECTION INTRAMUSCULAR; INTRAVENOUS at 22:46

## 2022-11-16 RX ADMIN — ONDANSETRON 4 MG: 2 INJECTION INTRAMUSCULAR; INTRAVENOUS at 07:52

## 2022-11-16 RX ADMIN — ENOXAPARIN SODIUM 30 MG: 100 INJECTION SUBCUTANEOUS at 21:00

## 2022-11-16 RX ADMIN — ONDANSETRON 4 MG: 2 INJECTION INTRAMUSCULAR; INTRAVENOUS at 16:53

## 2022-11-16 RX ADMIN — SODIUM CHLORIDE: 9 INJECTION, SOLUTION INTRAVENOUS at 08:58

## 2022-11-16 RX ADMIN — MORPHINE SULFATE 2 MG: 2 INJECTION, SOLUTION INTRAMUSCULAR; INTRAVENOUS at 05:11

## 2022-11-16 RX ADMIN — OXYCODONE AND ACETAMINOPHEN 1 TABLET: 5; 325 TABLET ORAL at 18:47

## 2022-11-16 RX ADMIN — MORPHINE SULFATE 2 MG: 2 INJECTION, SOLUTION INTRAMUSCULAR; INTRAVENOUS at 16:53

## 2022-11-16 ASSESSMENT — PAIN DESCRIPTION - DESCRIPTORS
DESCRIPTORS: ACHING

## 2022-11-16 ASSESSMENT — PAIN DESCRIPTION - ONSET
ONSET: ON-GOING

## 2022-11-16 ASSESSMENT — PAIN DESCRIPTION - FREQUENCY
FREQUENCY: CONTINUOUS

## 2022-11-16 ASSESSMENT — PAIN SCALES - GENERAL
PAINLEVEL_OUTOF10: 4
PAINLEVEL_OUTOF10: 7
PAINLEVEL_OUTOF10: 8
PAINLEVEL_OUTOF10: 0
PAINLEVEL_OUTOF10: 7
PAINLEVEL_OUTOF10: 10
PAINLEVEL_OUTOF10: 8
PAINLEVEL_OUTOF10: 6
PAINLEVEL_OUTOF10: 6
PAINLEVEL_OUTOF10: 10
PAINLEVEL_OUTOF10: 8

## 2022-11-16 ASSESSMENT — PAIN DESCRIPTION - ORIENTATION
ORIENTATION: RIGHT;LEFT
ORIENTATION: RIGHT;LEFT;MID;UPPER

## 2022-11-16 ASSESSMENT — PAIN DESCRIPTION - PAIN TYPE
TYPE: ACUTE PAIN

## 2022-11-16 ASSESSMENT — PAIN - FUNCTIONAL ASSESSMENT: PAIN_FUNCTIONAL_ASSESSMENT: PREVENTS OR INTERFERES SOME ACTIVE ACTIVITIES AND ADLS

## 2022-11-16 ASSESSMENT — PAIN DESCRIPTION - LOCATION
LOCATION: BACK

## 2022-11-16 NOTE — PLAN OF CARE
Problem: Discharge Planning  Goal: Discharge to home or other facility with appropriate resources  11/16/2022 1153 by Liberty Perales  Outcome: Progressing     Problem: Pain  Goal: Verbalizes/displays adequate comfort level or baseline comfort level  11/16/2022 1153 by Liberty Perales  Outcome: Progressing  Note: Pt reported pain level of an 6 on a scale of 1-10 located in back. Pt medicated w/ prn percocet. Pt reported relief.

## 2022-11-16 NOTE — PROGRESS NOTES
4 Eyes Admission Assessment     I agree as the admission nurse that 2 RN's have performed a thorough Head to Toe Skin Assessment on the patient. ALL assessment sites listed below have been assessed on admission. Areas assessed by both nurses: yes  [x]   Head, Face, and Ears   [x]   Shoulders, Back, and Chest  [x]   Arms, Elbows, and Hands   [x]   Coccyx, Sacrum, and Ischium  [x]   Legs, Feet, and Heels        Does the Patient have Skin Breakdown?   No         Ulises Prevention initiated:  NA   Wound Care Orders initiated:  NA      [facility] nurse consulted for Pressure Injury (Stage 3,4, Unstageable, DTI, NWPT, and Complex wounds) or Ulises score 18 or lower:  NA      Nurse 1 eSignature: Electronically signed by Gerri Ramirez RN on 11/15/22 at 10:43 PM EST    **SHARE this note so that the co-signing nurse is able to place an eSignature**    Nurse 2 eSignature: Electronically signed by Navjot Montesinos RN on 11/16/22 at 3:49 AM EST

## 2022-11-16 NOTE — PROGRESS NOTES
Pt a/o x4. VSS. Pt reported bilateral flank pain 6 out of 10. Pt medicated w/ prn percocet. Pt reported relief. Will continue to monitor patient.      Coy Jordan RN

## 2022-11-16 NOTE — ED NOTES
Report called to 3S, receiving RN updated on POC and pt history. Pt is agreeable and stable at this time. No questions or concerns.       Sammi Lyons , [facility]  11/15/22 7002

## 2022-11-16 NOTE — PROGRESS NOTES
Patient received to room [id number] from ED. Patient A&Ox4 upon arrival. VSS. Patient oriented to room, staff, and call system. Educated on fall protocol and hourly rounding. Assessment as documented. IVF started. Bed locked in low position. Patient informed to utilize call light with any needs. Pt verbalized understanding. Call light within reach. Will continue to monitor.

## 2022-11-16 NOTE — PROGRESS NOTES
Hospitalist Progress Note      PCP: Jeanine Flores MD    Date of Admission: 11/15/2022    Chief Complaint: dysuria      Hospital Course:   30 y.o. female with past medical history of gastric bypass who presents to the emergency department with dysuria, increased urinary urgency and frequency, and low back pain starting 1 week ago and progressively worsening. CT a/p suggested acute pyelo-ureteritis    Subjective:      Patient remain afebrile. She states she feels miserable. She still have a lot of right flank pain, worse when urinating. Medications:  Reviewed    Infusion Medications    sodium chloride      sodium chloride 100 mL/hr at 11/15/22 2235     Scheduled Medications    lidocaine  1 patch TransDERmal Daily    sodium chloride flush  5-40 mL IntraVENous 2 times per day    enoxaparin  30 mg SubCUTAneous BID    cefTRIAXone (ROCEPHIN) IV  1,000 mg IntraVENous Q24H     PRN Meds: droperidol, sodium chloride flush, sodium chloride, ondansetron **OR** ondansetron, polyethylene glycol, acetaminophen **OR** acetaminophen, morphine, oxyCODONE-acetaminophen      Intake/Output Summary (Last 24 hours) at 11/16/2022 0832  Last data filed at 11/16/2022 0745  Gross per 24 hour   Intake 240 ml   Output 925 ml   Net -685 ml       Physical Exam Performed:    /74   Pulse 88   Temp 98.4 °F (36.9 °C) (Oral)   Resp 18   Ht 5' 8.5\" (1.74 m)   Wt 228 lb (103.4 kg)   SpO2 100%   BMI 34.16 kg/m²     General appearance: Mild distress    Respiratory:  Normal respiratory effort. Clear to auscultation,  Cardiovascular: Regular rate and rhythm with normal S1/S2 without murmurs, rubs or gallops. Abdomen: Soft, non-tender, non-distended with normal bowel sounds. Musculoskeletal: No clubbing, cyanosis or edema bilaterally. Skin: Skin color, texture, turgor normal.  No rashes or lesions. Neurologic:  Neurovascularly intact without any focal sensory/motor deficits.   Psychiatric: Alert and oriented, thought content appropriate,      Labs:   Recent Labs     11/15/22  1503 11/16/22  0623   WBC 10.6 9.3   HGB 11.1* 9.3*   HCT 34.4* 28.6*   * 439     Recent Labs     11/15/22  1503 11/16/22  0623    139   K 4.2 3.6    105   CO2 24 25   BUN 7 6*   CREATININE 0.7 0.6   CALCIUM 9.5 8.9     Recent Labs     11/15/22  1503 11/16/22  0623   AST 13* 9*   ALT 9* 7*   BILIDIR <0.2 <0.2   BILITOT 0.4 0.5   ALKPHOS 107 96     No results for input(s): INR in the last 72 hours. No results for input(s): Sharri Bigness in the last 72 hours. Urinalysis:      Lab Results   Component Value Date/Time    NITRU POSITIVE 11/15/2022 03:03 PM    WBCUA 6-9 11/15/2022 03:03 PM    BACTERIA Rare 07/28/2014 11:30 PM    RBCUA 3-4 11/15/2022 03:03 PM    BLOODU TRACE-INTACT 11/15/2022 03:03 PM    SPECGRAV 1.020 11/15/2022 03:03 PM    GLUCOSEU Negative 11/15/2022 03:03 PM    GLUCOSEU NEGATIVE 06/25/2011 08:30 PM       Radiology:  CT ABDOMEN PELVIS W IV CONTRAST Additional Contrast? None   Final Result      1. Urothelial thickening in the bilateral renal collecting systems and ureters greatest on the left with moderate periureteral stranding. Underlying inflammatory or infectious etiology such as acute pyelo-ureteritis is suggested. Correlate clinically. 2.  No other findings for acute intra-abdominal or pelvic abnormality. 3.  Status post gastric bypass. Assessment/Plan:    Active Hospital Problems    Diagnosis     Pyelonephritis [N12]      Priority: Medium   History of gastric sleeve in April 2021  History of anemia    -urine and blood cx pending. Continue rocephin. Continue pain control. Add stool softeners  -Repeat H&H. Anemia seems at baseline. Hemodilution effect present. DVT Prophylaxis: lovenox  Diet: ADULT DIET;  Regular  Code Status: Full Code  PT/OT Eval Status: no indicated     Tao Solo MD

## 2022-11-17 PROCEDURE — 1200000000 HC SEMI PRIVATE

## 2022-11-17 PROCEDURE — 6360000002 HC RX W HCPCS: Performed by: INTERNAL MEDICINE

## 2022-11-17 PROCEDURE — 6370000000 HC RX 637 (ALT 250 FOR IP): Performed by: INTERNAL MEDICINE

## 2022-11-17 PROCEDURE — 2580000003 HC RX 258: Performed by: INTERNAL MEDICINE

## 2022-11-17 RX ORDER — KETOROLAC TROMETHAMINE 30 MG/ML
30 INJECTION, SOLUTION INTRAMUSCULAR; INTRAVENOUS EVERY 6 HOURS PRN
Status: DISCONTINUED | OUTPATIENT
Start: 2022-11-17 | End: 2022-11-18 | Stop reason: HOSPADM

## 2022-11-17 RX ADMIN — MORPHINE SULFATE 2 MG: 2 INJECTION, SOLUTION INTRAMUSCULAR; INTRAVENOUS at 18:07

## 2022-11-17 RX ADMIN — ENOXAPARIN SODIUM 30 MG: 100 INJECTION SUBCUTANEOUS at 20:30

## 2022-11-17 RX ADMIN — ONDANSETRON 4 MG: 2 INJECTION INTRAMUSCULAR; INTRAVENOUS at 20:30

## 2022-11-17 RX ADMIN — ENOXAPARIN SODIUM 30 MG: 100 INJECTION SUBCUTANEOUS at 08:18

## 2022-11-17 RX ADMIN — MORPHINE SULFATE 2 MG: 2 INJECTION, SOLUTION INTRAMUSCULAR; INTRAVENOUS at 14:08

## 2022-11-17 RX ADMIN — OXYCODONE AND ACETAMINOPHEN 1 TABLET: 5; 325 TABLET ORAL at 21:24

## 2022-11-17 RX ADMIN — CEFTRIAXONE 1000 MG: 1 INJECTION, POWDER, FOR SOLUTION INTRAMUSCULAR; INTRAVENOUS at 18:10

## 2022-11-17 RX ADMIN — ONDANSETRON 4 MG: 2 INJECTION INTRAMUSCULAR; INTRAVENOUS at 12:12

## 2022-11-17 RX ADMIN — MORPHINE SULFATE 2 MG: 2 INJECTION, SOLUTION INTRAMUSCULAR; INTRAVENOUS at 22:26

## 2022-11-17 RX ADMIN — OXYCODONE AND ACETAMINOPHEN 1 TABLET: 5; 325 TABLET ORAL at 11:50

## 2022-11-17 RX ADMIN — MORPHINE SULFATE 2 MG: 2 INJECTION, SOLUTION INTRAMUSCULAR; INTRAVENOUS at 08:18

## 2022-11-17 RX ADMIN — SODIUM CHLORIDE, PRESERVATIVE FREE 10 ML: 5 INJECTION INTRAVENOUS at 08:19

## 2022-11-17 RX ADMIN — KETOROLAC TROMETHAMINE 30 MG: 30 INJECTION, SOLUTION INTRAMUSCULAR; INTRAVENOUS at 20:30

## 2022-11-17 RX ADMIN — OXYCODONE AND ACETAMINOPHEN 1 TABLET: 5; 325 TABLET ORAL at 04:16

## 2022-11-17 RX ADMIN — KETOROLAC TROMETHAMINE 30 MG: 30 INJECTION, SOLUTION INTRAMUSCULAR; INTRAVENOUS at 06:44

## 2022-11-17 RX ADMIN — SODIUM CHLORIDE, PRESERVATIVE FREE 10 ML: 5 INJECTION INTRAVENOUS at 20:30

## 2022-11-17 RX ADMIN — MORPHINE SULFATE 2 MG: 2 INJECTION, SOLUTION INTRAMUSCULAR; INTRAVENOUS at 02:03

## 2022-11-17 RX ADMIN — KETOROLAC TROMETHAMINE 30 MG: 30 INJECTION, SOLUTION INTRAMUSCULAR; INTRAVENOUS at 00:39

## 2022-11-17 ASSESSMENT — PAIN DESCRIPTION - ONSET
ONSET: ON-GOING
ONSET: ON-GOING

## 2022-11-17 ASSESSMENT — PAIN SCALES - GENERAL
PAINLEVEL_OUTOF10: 10
PAINLEVEL_OUTOF10: 7
PAINLEVEL_OUTOF10: 3
PAINLEVEL_OUTOF10: 3
PAINLEVEL_OUTOF10: 7
PAINLEVEL_OUTOF10: 4
PAINLEVEL_OUTOF10: 0
PAINLEVEL_OUTOF10: 6
PAINLEVEL_OUTOF10: 7
PAINLEVEL_OUTOF10: 9
PAINLEVEL_OUTOF10: 6
PAINLEVEL_OUTOF10: 3
PAINLEVEL_OUTOF10: 7
PAINLEVEL_OUTOF10: 5

## 2022-11-17 ASSESSMENT — PAIN DESCRIPTION - LOCATION
LOCATION: BACK
LOCATION: BACK;FLANK
LOCATION: BACK;FLANK
LOCATION: BACK

## 2022-11-17 ASSESSMENT — PAIN DESCRIPTION - DESCRIPTORS
DESCRIPTORS: ACHING

## 2022-11-17 ASSESSMENT — PAIN DESCRIPTION - ORIENTATION
ORIENTATION: RIGHT;LEFT
ORIENTATION: RIGHT;LEFT
ORIENTATION: RIGHT
ORIENTATION: RIGHT;LEFT
ORIENTATION: RIGHT;LEFT
ORIENTATION: RIGHT
ORIENTATION: RIGHT;LEFT
ORIENTATION: RIGHT;LEFT

## 2022-11-17 ASSESSMENT — PAIN DESCRIPTION - FREQUENCY
FREQUENCY: CONTINUOUS
FREQUENCY: CONTINUOUS

## 2022-11-17 ASSESSMENT — PAIN - FUNCTIONAL ASSESSMENT
PAIN_FUNCTIONAL_ASSESSMENT: ACTIVITIES ARE NOT PREVENTED
PAIN_FUNCTIONAL_ASSESSMENT: ACTIVITIES ARE NOT PREVENTED

## 2022-11-17 ASSESSMENT — PAIN SCALES - WONG BAKER
WONGBAKER_NUMERICALRESPONSE: 0
WONGBAKER_NUMERICALRESPONSE: 0

## 2022-11-17 ASSESSMENT — PAIN DESCRIPTION - PAIN TYPE
TYPE: ACUTE PAIN
TYPE: ACUTE PAIN

## 2022-11-17 NOTE — CARE COORDINATION
Still following for any discharge needs. Anticipate no needs at discharge.  Electronically signed by Eloy Seo RN on 11/17/2022 at 11:20 AM

## 2022-11-17 NOTE — PROGRESS NOTES
Pt A&Ox4. VSS. IVF infusing. Pt remains in moderate to severe bilat flank pain throughout shift. Pt medicated with PRN percocet, morphine and Toradol for management. Strict I&Os documented. Will continue to monitor.

## 2022-11-17 NOTE — PROGRESS NOTES
Hospitalist Progress Note      PCP: Stephanie Wynne MD    Date of Admission: 11/15/2022    Chief Complaint: dysuria      Hospital Course:   30 y.o. female with past medical history of gastric bypass who presents to the emergency department with dysuria, increased urinary urgency and frequency, and low back pain starting 1 week ago and progressively worsening. CT a/p suggested acute pyelo-ureteritis. Subjective:      Patient remain afebrile. Feels a little better than yesterday. Flank pain improved, still with some bladder discomfort but no back pain. Mild nausea, no emesis. Medications:  Reviewed    Infusion Medications    sodium chloride      sodium chloride 100 mL/hr at 11/16/22 0858     Scheduled Medications    lidocaine  1 patch TransDERmal Daily    sodium chloride flush  5-40 mL IntraVENous 2 times per day    enoxaparin  30 mg SubCUTAneous BID    cefTRIAXone (ROCEPHIN) IV  1,000 mg IntraVENous Q24H     PRN Meds: ketorolac, droperidol, sodium chloride flush, sodium chloride, ondansetron **OR** ondansetron, polyethylene glycol, acetaminophen **OR** acetaminophen, morphine, oxyCODONE-acetaminophen      Intake/Output Summary (Last 24 hours) at 11/17/2022 1413  Last data filed at 11/17/2022 1212  Gross per 24 hour   Intake 2043 ml   Output 1425 ml   Net 618 ml         Physical Exam Performed:    /76   Pulse 76   Temp 98.3 °F (36.8 °C) (Oral)   Resp 18   Ht 5' 8.5\" (1.74 m)   Wt 231 lb (104.8 kg)   SpO2 100%   BMI 34.61 kg/m²     General appearance: Mild distress    Respiratory:  Normal respiratory effort. Clear to auscultation,  Cardiovascular: Regular rate and rhythm with normal S1/S2 without murmurs, rubs or gallops. Abdomen: Soft, non-tender, non-distended with normal bowel sounds. Musculoskeletal: No clubbing, cyanosis or edema bilaterally. Skin: Skin color, texture, turgor normal.  No rashes or lesions.   Neurologic:  Neurovascularly intact without any focal sensory/motor

## 2022-11-17 NOTE — PROGRESS NOTES
11/17/22 1434   Encounter Summary   Encounter Overview/Reason  Initial Encounter   Service Provided For: Patient   Referral/Consult From: [facility] Parent; Family members   Last Encounter  11/17/22  (fr kristina riley)   Complexity of Encounter Low   Begin Time 1400   End Time  1435   Total Time Calculated 35 min   Encounter    Type Initial Screen/Assessment   Spiritual/Emotional needs   Type Spiritual Support   Assessment/Intervention/Outcome   Assessment Coping; Hopeful   Intervention Active listening;Confronted/Challenged; Discussed belief system/Catholic practices/tammy;Discussed illness injury and its impact; Discussed relationship with God;Explored/Affirmed feelings, thoughts, concerns;Explored Coping Skills/Resources;Prayer (assurance of)/[place]   Outcome Comfort;Coping;Encouraged;Engaged in conversation;Expressed feelings, needs, and concerns;Expressed Gratitude   Electronically signed by Priyanka Briscoe on 11/17/2022 at 2:38 PM

## 2022-11-18 VITALS
WEIGHT: 231 LBS | TEMPERATURE: 98.6 F | BODY MASS INDEX: 34.21 KG/M2 | RESPIRATION RATE: 15 BRPM | OXYGEN SATURATION: 99 % | DIASTOLIC BLOOD PRESSURE: 86 MMHG | HEART RATE: 78 BPM | SYSTOLIC BLOOD PRESSURE: 121 MMHG | HEIGHT: 69 IN

## 2022-11-18 LAB
ORGANISM: ABNORMAL
URINE CULTURE, ROUTINE: ABNORMAL

## 2022-11-18 PROCEDURE — 2580000003 HC RX 258: Performed by: INTERNAL MEDICINE

## 2022-11-18 PROCEDURE — 6360000002 HC RX W HCPCS: Performed by: INTERNAL MEDICINE

## 2022-11-18 PROCEDURE — 6370000000 HC RX 637 (ALT 250 FOR IP): Performed by: INTERNAL MEDICINE

## 2022-11-18 PROCEDURE — 6360000002 HC RX W HCPCS: Performed by: STUDENT IN AN ORGANIZED HEALTH CARE EDUCATION/TRAINING PROGRAM

## 2022-11-18 RX ORDER — ONDANSETRON 4 MG/1
4 TABLET, ORALLY DISINTEGRATING ORAL EVERY 8 HOURS PRN
Qty: 15 TABLET | Refills: 0 | Status: SHIPPED | OUTPATIENT
Start: 2022-11-18 | End: 2022-11-23

## 2022-11-18 RX ORDER — CIPROFLOXACIN 500 MG/1
500 TABLET, FILM COATED ORAL 2 TIMES DAILY
Qty: 14 TABLET | Refills: 0 | Status: SHIPPED | OUTPATIENT
Start: 2022-11-18 | End: 2022-11-29

## 2022-11-18 RX ORDER — IBUPROFEN 600 MG/1
600 TABLET ORAL 4 TIMES DAILY PRN
Qty: 30 TABLET | Refills: 0 | Status: SHIPPED | OUTPATIENT
Start: 2022-11-18 | End: 2022-11-23

## 2022-11-18 RX ORDER — OXYCODONE HYDROCHLORIDE AND ACETAMINOPHEN 5; 325 MG/1; MG/1
1 TABLET ORAL EVERY 6 HOURS PRN
Qty: 12 TABLET | Refills: 0 | Status: SHIPPED | OUTPATIENT
Start: 2022-11-18 | End: 2022-11-21

## 2022-11-18 RX ORDER — SENNA AND DOCUSATE SODIUM 50; 8.6 MG/1; MG/1
1 TABLET, FILM COATED ORAL DAILY
Qty: 7 TABLET | Refills: 1 | Status: SHIPPED | OUTPATIENT
Start: 2022-11-18 | End: 2022-11-25

## 2022-11-18 RX ADMIN — SODIUM CHLORIDE: 9 INJECTION, SOLUTION INTRAVENOUS at 03:41

## 2022-11-18 RX ADMIN — ENOXAPARIN SODIUM 30 MG: 100 INJECTION SUBCUTANEOUS at 09:33

## 2022-11-18 RX ADMIN — DROPERIDOL 1.25 MG: 2.5 INJECTION, SOLUTION INTRAMUSCULAR; INTRAVENOUS at 07:39

## 2022-11-18 RX ADMIN — OXYCODONE AND ACETAMINOPHEN 1 TABLET: 5; 325 TABLET ORAL at 01:34

## 2022-11-18 RX ADMIN — OXYCODONE AND ACETAMINOPHEN 1 TABLET: 5; 325 TABLET ORAL at 07:32

## 2022-11-18 RX ADMIN — KETOROLAC TROMETHAMINE 30 MG: 30 INJECTION, SOLUTION INTRAMUSCULAR; INTRAVENOUS at 03:46

## 2022-11-18 ASSESSMENT — PAIN DESCRIPTION - LOCATION
LOCATION: FLANK
LOCATION: FLANK;BACK
LOCATION: FLANK

## 2022-11-18 ASSESSMENT — PAIN SCALES - GENERAL
PAINLEVEL_OUTOF10: 8
PAINLEVEL_OUTOF10: 0
PAINLEVEL_OUTOF10: 6
PAINLEVEL_OUTOF10: 0
PAINLEVEL_OUTOF10: 6
PAINLEVEL_OUTOF10: 8
PAINLEVEL_OUTOF10: 5

## 2022-11-18 ASSESSMENT — PAIN DESCRIPTION - PAIN TYPE
TYPE: ACUTE PAIN
TYPE: ACUTE PAIN

## 2022-11-18 ASSESSMENT — PAIN DESCRIPTION - DESCRIPTORS
DESCRIPTORS: ACHING
DESCRIPTORS: ACHING

## 2022-11-18 ASSESSMENT — PAIN DESCRIPTION - ORIENTATION
ORIENTATION: RIGHT

## 2022-11-18 ASSESSMENT — PAIN DESCRIPTION - FREQUENCY
FREQUENCY: CONTINUOUS
FREQUENCY: CONTINUOUS

## 2022-11-18 ASSESSMENT — PAIN DESCRIPTION - ONSET
ONSET: ON-GOING
ONSET: ON-GOING
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
## 2022-11-18 NOTE — PROGRESS NOTES
AVS reviewed with patient. Pain script given to patient and patient is aware of where to  other medications. Reviewed meds and side effects. Pt escorted to her car via wheelchair by resource RN.

## 2022-11-18 NOTE — DISCHARGE SUMMARY
Hospital Medicine Discharge Summary    Patient ID: Tho Donaldson      Patient's PCP: Tammy Solomon MD    Admit Date: 11/15/2022     Discharge Date: 11/18/22    Admitting Physician: No admitting provider for patient encounter. Discharge Physician: Jesse Mendoza MD     Discharge Diagnoses: Active Hospital Problems    Diagnosis Date Noted    Acute pyelonephritis [N10] 11/16/2022     Priority: Medium    Pyelonephritis [N12] 11/15/2022     Priority: Medium       The patient was seen and examined on day of discharge and this discharge summary is in conjunction with any daily progress note from day of discharge. Condition at discharge - stable    Hospital Course:     30 y.o. female with past medical history of gastric bypass who presents to the emergency department with dysuria, increased urinary urgency and frequency, and low back pain starting 1 week ago and progressively worsening. CT a/p suggested acute pyelo-ureteritis. Patient was treated with rocephin, pain meds. Urine cx positive for pansensitive E.coli. Blood cx negative. She is discharged on cipro for another 11 days. Exam:     /86   Pulse 78   Temp 98.6 °F (37 °C) (Oral)   Resp 15   Ht 5' 8.5\" (1.74 m)   Wt 231 lb (104.8 kg)   SpO2 99%   BMI 34.61 kg/m²     General appearance: No apparent distress  HEENT:  Conjunctivae/corneas clear. Neck: Supple, No jugular venous distention. Respiratory:  Normal respiratory effort. Clear to auscultation, bilaterally without Rales/Wheezes/Rhonchi. Cardiovascular: Regular rate and rhythm with normal S1/S2 without murmurs, rubs or gallops. Abdomen: Soft, non-tender, non-distended, normal bowel sounds. Musculoskelatal: No clubbing, cyanosis or edema bilaterally. Skin: Skin color, texture, turgor normal.   Neurologic: no focal neurologic deficits.  grossly non-focal.  Psychiatric: Alert and oriented, normal mood    Consults:     IP CONSULT TO HOSPITALIST      Code Status:  Full Code    Activity: activity as tolerated    Labs: For convenience and continuity at follow-up the following most recent labs are provided:      CBC:    Lab Results   Component Value Date/Time    WBC 9.3 11/16/2022 06:23 AM    HGB 9.5 11/16/2022 02:17 PM    HCT 29.6 11/16/2022 02:17 PM     11/16/2022 06:23 AM       Renal:    Lab Results   Component Value Date/Time     11/16/2022 06:23 AM    K 3.6 11/16/2022 06:23 AM     11/16/2022 06:23 AM    CO2 25 11/16/2022 06:23 AM    BUN 6 11/16/2022 06:23 AM    CREATININE 0.6 11/16/2022 06:23 AM    CALCIUM 8.9 11/16/2022 06:23 AM    PHOS 3.2 04/13/2021 08:20 PM       Discharge Medications:     Current Discharge Medication List             Details   oxyCODONE-acetaminophen (PERCOCET) 5-325 MG per tablet Take 1 tablet by mouth every 6 hours as needed for Pain for up to 3 days. Qty: 12 tablet, Refills: 0    Comments: Reduce doses taken as pain becomes manageable  Associated Diagnoses: Acute pyelonephritis      ondansetron (ZOFRAN-ODT) 4 MG disintegrating tablet Take 1 tablet by mouth every 8 hours as needed for Nausea or Vomiting  Qty: 15 tablet, Refills: 0      ciprofloxacin (CIPRO) 500 MG tablet Take 1 tablet by mouth 2 times daily for 11 days  Qty: 14 tablet, Refills: 0      sennosides-docusate sodium (SENOKOT-S) 8.6-50 MG tablet Take 1 tablet by mouth daily for 7 days  Qty: 7 tablet, Refills: 1      ibuprofen (ADVIL;MOTRIN) 600 MG tablet Take 1 tablet by mouth 4 times daily as needed for Pain  Qty: 30 tablet, Refills: 0                Details   lidocaine (LIDODERM) 5 % Place 1 patch onto the skin daily 12 hours on, 12 hours off.   Qty: 30 patch, Refills: 0      Multiple Vitamin (MULTIVITAMIN, BARIATRIC FUSION COMPLETE, CHEW TAB) Take 4 tablets by mouth daily      loratadine (CLARITIN) 10 MG capsule Take 10 mg by mouth daily      fluticasone (FLONASE) 50 MCG/ACT nasal spray 2 sprays by Nasal route daily 2 sprays each nostril daily  Qty: 1 Bottle, Refills: 0 Time Spent on discharge is more than 30 mints in the examination, evaluation, counseling and review of medications and discharge plan. Signed:    Joselyn Vergara MD   11/18/2022      Thank you Stacie Lee MD for the opportunity to be involved in this patient's care. If you have any questions or concerns please feel free to contact me at [phone].

## 2022-11-18 NOTE — PROGRESS NOTES
Patient is A&O x4.  RA, sat 99%. No complaints of SOB. Medicated as needed for c/o right sided flank/back pain. Respirations appear to easy and unlabored. Lungs clear. Respirations easy with no complaints of cough. No complaints of nausea/vomiting/diarrhea. Up ad gibson to the bathroom as needed. IVF infusing per left FA PIV as ordered. Tolerating regular diet. Plan of care and safety measures reviewed with the patient. Call light in reach. Will continue to monitor.   Electronically signed by Jody Wilhelm RN on 11/17/2022 at 9:38 PM

## 2022-11-18 NOTE — PROGRESS NOTES
Return to work note    11/18/22     Please excuse Marge Coral from work from 11/15/2022 through 11/18/22 as she was ill and required medical attention. She is able to return to work on 11/21/22. Feel free to call either her PCP or [place] [phone] for any ethically appropriate questions. Medical history will not be provided unless legally required.     With regards,      Gill Torrez MD  11/18/22

## 2022-11-20 LAB
BLOOD CULTURE, ROUTINE: NORMAL
CULTURE, BLOOD 2: NORMAL

## 2023-04-24 NOTE — PROGRESS NOTES
VSS, a/ox4. Pt reports pain at 6/10, pt w scheduled robaxin, PRN levsin given for abd cramping. PCA pump in place. Pt remains NPO w IVF @150ml/hr. Denies nausea/emesis. Pt voiding freely. Pt has been in/out of bed w steady gait. Has walked in halls x2, tolerating fairly well. Encouraged to use IS hourly when awake. SCD boots on while in bed. Bed remains in lowest position, call light within reach. Continues to call out appropriately when needing assistance. All needs met.  Will cont to monitor done Yes

## 2023-05-05 ENCOUNTER — TELEMEDICINE (OUTPATIENT)
Dept: BARIATRICS/WEIGHT MGMT | Age: 38
End: 2023-05-05
Payer: COMMERCIAL

## 2023-05-05 DIAGNOSIS — E66.9 CLASS 2 OBESITY: Primary | ICD-10-CM

## 2023-05-05 DIAGNOSIS — Z98.84 S/P LAPAROSCOPIC SLEEVE GASTRECTOMY: ICD-10-CM

## 2023-05-05 PROCEDURE — G8427 DOCREV CUR MEDS BY ELIG CLIN: HCPCS | Performed by: FAMILY MEDICINE

## 2023-05-05 PROCEDURE — 99214 OFFICE O/P EST MOD 30 MIN: CPT | Performed by: FAMILY MEDICINE

## 2023-05-05 ASSESSMENT — ENCOUNTER SYMPTOMS
WHEEZING: 0
ABDOMINAL DISTENTION: 0
CHOKING: 0
CHEST TIGHTNESS: 0
COUGH: 0
ABDOMINAL PAIN: 0
VOMITING: 0
DIARRHEA: 0
EYE PAIN: 0
CONSTIPATION: 0
BLOOD IN STOOL: 0
NAUSEA: 0
PHOTOPHOBIA: 0
APNEA: 0
SHORTNESS OF BREATH: 0

## 2023-05-05 NOTE — PROGRESS NOTES
Patient: Claudetta Hey                      Encounter Date: 5/5/2023    YOB: 1985               Age: 30 y.o. Chief Complaint   Patient presents with    Weight Management     F/u MWM       Patient identification was verified at the start of the visit. Patient-Reported Vitals 5/5/2023   Patient-Reported Weight 228   Patient-Reported Height 58   Patient-Reported Systolic 323   Patient-Reported Diastolic 89   Patient-Reported Temperature 97.3         BP Readings from Last 1 Encounters:   11/18/22 121/86       BMI Readings from Last 1 Encounters:   04/12/23 36.07 kg/m²       Pulse Readings from Last 1 Encounters:   11/18/22 78          Wt Readings from Last 3 Encounters:   04/12/23 237 lb 3.2 oz (107.6 kg)   11/17/22 231 lb (104.8 kg)   11/02/22 230 lb (104.3 kg)        HPI: 30 y.o. female with a long-standing history of obesity s/p sleeve gastrectomy in 2021 presents today for a virtual video follow-up. Her weight is stable from her last visit in June 2022. Current treatment includes low carb/gian diet and exercise. Food recall and assessment reviewed. Making better dietary choices. Motivated to continue losing weight before her breast reduction/body contouring in June. Interested in aom to help with appetite regualation.             Allergies   Allergen Reactions    Tomato Anaphylaxis, Hives and Swelling         Current Outpatient Medications:     Ferrous Sulfate (IRON) 325 (65 Fe) MG TABS, Take by mouth, Disp: , Rfl:     ibuprofen (ADVIL;MOTRIN) 600 MG tablet, Take 1 tablet by mouth 4 times daily as needed for Pain, Disp: 30 tablet, Rfl: 0    lidocaine (LIDODERM) 5 %, Place 1 patch onto the skin daily 12 hours on, 12 hours off., Disp: 30 patch, Rfl: 0    Multiple Vitamin (MULTIVITAMIN, BARIATRIC FUSION COMPLETE, CHEW TAB), Take 4 tablets by mouth daily, Disp: , Rfl:     loratadine (CLARITIN) 10 MG capsule, Take 1 capsule by mouth daily, Disp: , Rfl:     fluticasone (FLONASE) 50 MCG/ACT nasal

## 2023-05-08 ENCOUNTER — HOSPITAL ENCOUNTER (OUTPATIENT)
Age: 38
Discharge: HOME | End: 2023-05-08
Payer: COMMERCIAL

## 2023-05-08 DIAGNOSIS — E66.9 CLASS 2 OBESITY: ICD-10-CM

## 2023-05-08 DIAGNOSIS — Z98.84 S/P LAPAROSCOPIC SLEEVE GASTRECTOMY: ICD-10-CM

## 2023-05-08 LAB
25(OH)D3 SERPL-MCNC: 15.3 NG/ML
ALBUMIN SERPL-MCNC: 3.8 G/DL (ref 3.4–5)
ALBUMIN/GLOB SERPL: 1.3 {RATIO} (ref 1.1–2.2)
ALP SERPL-CCNC: 70 U/L (ref 40–129)
ALT SERPL-CCNC: 9 U/L (ref 10–40)
ANION GAP SERPL CALCULATED.3IONS-SCNC: 8 MMOL/L (ref 3–16)
AST SERPL-CCNC: 15 U/L (ref 15–37)
BILIRUB SERPL-MCNC: 0.4 MG/DL (ref 0–1)
BUN SERPL-MCNC: 11 MG/DL (ref 7–20)
CALCIUM SERPL-MCNC: 9 MG/DL (ref 8.3–10.6)
CHLORIDE SERPL-SCNC: 109 MMOL/L (ref 99–110)
CHOLEST SERPL-MCNC: 127 MG/DL (ref 0–199)
CO2 SERPL-SCNC: 25 MMOL/L (ref 21–32)
CREAT SERPL-MCNC: 0.6 MG/DL (ref 0.6–1.1)
DEPRECATED RDW RBC AUTO: 21.7 % (ref 12.4–15.4)
FOLATE SERPL-MCNC: 15.78 NG/ML (ref 4.78–24.2)
GFR SERPLBLD CREATININE-BSD FMLA CKD-EPI: >60 ML/MIN/{1.73_M2}
GLUCOSE SERPL-MCNC: 98 MG/DL (ref 70–99)
HCT VFR BLD AUTO: 36.4 % (ref 36–48)
HDLC SERPL-MCNC: 49 MG/DL (ref 40–60)
HGB BLD-MCNC: 11.8 G/DL (ref 12–16)
LDLC SERPL CALC-MCNC: 72 MG/DL
MCH RBC QN AUTO: 26.5 PG (ref 26–34)
MCHC RBC AUTO-ENTMCNC: 32.4 G/DL (ref 31–36)
MCV RBC AUTO: 81.8 FL (ref 80–100)
PLATELET # BLD AUTO: 369 K/UL (ref 135–450)
PMV BLD AUTO: 7.8 FL (ref 5–10.5)
POTASSIUM SERPL-SCNC: 3.7 MMOL/L (ref 3.5–5.1)
PROT SERPL-MCNC: 6.7 G/DL (ref 6.4–8.2)
RBC # BLD AUTO: 4.46 M/UL (ref 4–5.2)
SODIUM SERPL-SCNC: 142 MMOL/L (ref 136–145)
TRIGL SERPL-MCNC: 31 MG/DL (ref 0–150)
TSH SERPL DL<=0.005 MIU/L-ACNC: 2.14 UIU/ML (ref 0.27–4.2)
VIT B12 SERPL-MCNC: 747 PG/ML (ref 211–911)
VLDLC SERPL CALC-MCNC: 6 MG/DL
WBC # BLD AUTO: 9.7 K/UL (ref 4–11)

## 2023-05-08 PROCEDURE — 82306 VITAMIN D 25 HYDROXY: CPT

## 2023-05-08 PROCEDURE — 82607 VITAMIN B-12: CPT

## 2023-05-08 PROCEDURE — 36415 COLL VENOUS BLD VENIPUNCTURE: CPT

## 2023-05-08 PROCEDURE — 80061 LIPID PANEL: CPT

## 2023-05-08 PROCEDURE — 83036 HEMOGLOBIN GLYCOSYLATED A1C: CPT

## 2023-05-08 PROCEDURE — 82746 ASSAY OF FOLIC ACID SERUM: CPT

## 2023-05-08 PROCEDURE — 84443 ASSAY THYROID STIM HORMONE: CPT

## 2023-05-08 PROCEDURE — 80053 COMPREHEN METABOLIC PANEL: CPT

## 2023-05-08 PROCEDURE — 85027 COMPLETE CBC AUTOMATED: CPT

## 2023-05-09 LAB
EST. AVERAGE GLUCOSE BLD GHB EST-MCNC: 96.8 MG/DL
HBA1C MFR BLD: 5 %

## 2023-05-12 ENCOUNTER — TELEMEDICINE (OUTPATIENT)
Dept: BARIATRICS/WEIGHT MGMT | Age: 38
End: 2023-05-12
Payer: COMMERCIAL

## 2023-05-12 DIAGNOSIS — Z98.84 S/P LAPAROSCOPIC SLEEVE GASTRECTOMY: ICD-10-CM

## 2023-05-12 DIAGNOSIS — E66.9 CLASS 2 OBESITY: Primary | ICD-10-CM

## 2023-05-12 DIAGNOSIS — Z71.3 DIETARY COUNSELING AND SURVEILLANCE: ICD-10-CM

## 2023-05-12 DIAGNOSIS — E55.9 VITAMIN D DEFICIENCY: ICD-10-CM

## 2023-05-12 PROCEDURE — G8427 DOCREV CUR MEDS BY ELIG CLIN: HCPCS | Performed by: FAMILY MEDICINE

## 2023-05-12 PROCEDURE — 99214 OFFICE O/P EST MOD 30 MIN: CPT | Performed by: FAMILY MEDICINE

## 2023-05-12 RX ORDER — FERROUS SULFATE 325(65) MG
325 TABLET ORAL
Qty: 30 TABLET | Refills: 2 | Status: SHIPPED | OUTPATIENT
Start: 2023-05-12

## 2023-05-12 RX ORDER — NALTREXONE HYDROCHLORIDE AND BUPROPION HYDROCHLORIDE 8; 90 MG/1; MG/1
TABLET, EXTENDED RELEASE ORAL
Qty: 120 TABLET | Refills: 0 | Status: SHIPPED | OUTPATIENT
Start: 2023-05-12

## 2023-05-12 RX ORDER — ERGOCALCIFEROL 1.25 MG/1
50000 CAPSULE ORAL WEEKLY
Qty: 8 CAPSULE | Refills: 0 | Status: SHIPPED | OUTPATIENT
Start: 2023-05-12

## 2023-05-12 ASSESSMENT — ENCOUNTER SYMPTOMS
EYE PAIN: 0
ABDOMINAL DISTENTION: 0
NAUSEA: 0
VOMITING: 0
CONSTIPATION: 0
PHOTOPHOBIA: 0
APNEA: 0
CHEST TIGHTNESS: 0
BLOOD IN STOOL: 0
WHEEZING: 0
SHORTNESS OF BREATH: 0
ABDOMINAL PAIN: 0
CHOKING: 0
DIARRHEA: 0
COUGH: 0

## 2023-06-02 ENCOUNTER — HOSPITAL ENCOUNTER (OUTPATIENT)
Dept: GENERAL RADIOLOGY | Age: 38
Discharge: HOME | End: 2023-06-02
Payer: COMMERCIAL

## 2023-06-02 ENCOUNTER — HOSPITAL ENCOUNTER (OUTPATIENT)
Age: 38
Discharge: HOME | End: 2023-06-02
Payer: COMMERCIAL

## 2023-06-02 DIAGNOSIS — Z01.818 PREOP EXAMINATION: ICD-10-CM

## 2023-06-02 LAB
EKG ATRIAL RATE: 71 BPM
EKG DIAGNOSIS: NORMAL
EKG P AXIS: 51 DEGREES
EKG P-R INTERVAL: 180 MS
EKG Q-T INTERVAL: 380 MS
EKG QRS DURATION: 96 MS
EKG QTC CALCULATION (BAZETT): 412 MS
EKG R AXIS: 41 DEGREES
EKG T AXIS: 33 DEGREES
EKG VENTRICULAR RATE: 71 BPM

## 2023-06-02 PROCEDURE — 93005 ELECTROCARDIOGRAM TRACING: CPT | Performed by: NURSE PRACTITIONER

## 2023-06-02 PROCEDURE — 71046 X-RAY EXAM CHEST 2 VIEWS: CPT

## 2023-06-03 ENCOUNTER — TELEMEDICINE (OUTPATIENT)
Dept: BARIATRICS/WEIGHT MGMT | Age: 38
End: 2023-06-03
Payer: COMMERCIAL

## 2023-06-03 DIAGNOSIS — E66.9 CLASS 2 OBESITY: Primary | ICD-10-CM

## 2023-06-03 DIAGNOSIS — Z71.3 DIETARY COUNSELING AND SURVEILLANCE: ICD-10-CM

## 2023-06-03 PROCEDURE — G8427 DOCREV CUR MEDS BY ELIG CLIN: HCPCS | Performed by: FAMILY MEDICINE

## 2023-06-03 PROCEDURE — 99213 OFFICE O/P EST LOW 20 MIN: CPT | Performed by: FAMILY MEDICINE

## 2023-06-03 ASSESSMENT — ENCOUNTER SYMPTOMS
ABDOMINAL DISTENTION: 0
COUGH: 0
VOMITING: 0
CONSTIPATION: 0
CHEST TIGHTNESS: 0
APNEA: 0
EYE PAIN: 0
NAUSEA: 0
CHOKING: 0
BLOOD IN STOOL: 0
DIARRHEA: 0
WHEEZING: 0
SHORTNESS OF BREATH: 0
ABDOMINAL PAIN: 0
PHOTOPHOBIA: 0

## 2023-06-03 NOTE — PROGRESS NOTES
Patient: Santiago Ramirez                      Encounter Date: 6/3/2023    YOB: 1985                Age: 30 y.o. Chief Complaint   Patient presents with    Weight Management     F/u MWM         Patient identification was verified at the start of the visit. Patient-Reported Vitals 6/3/2023   Patient-Reported Weight 230   Patient-Reported Height 58   Patient-Reported Systolic 754   Patient-Reported Diastolic 78   Patient-Reported Temperature 97.1         BP Readings from Last 1 Encounters:   11/18/22 121/86       BMI Readings from Last 1 Encounters:   04/12/23 36.07 kg/m²       Pulse Readings from Last 1 Encounters:   11/18/22 78          Wt Readings from Last 3 Encounters:   04/12/23 237 lb 3.2 oz (107.6 kg)   11/17/22 231 lb (104.8 kg)   11/02/22 230 lb (104.3 kg)        HPI: 30 y.o. female with a long-standing history of obesity presents today for virtual video follow-up. Her weight is stable from her last visit. Started Contrave  Was on tx for 2 weeks  No issues  Per surgeon's recommendations had to stop tx in preparation for body contouring scheduled for 6/30         Allergies   Allergen Reactions    Tomato Anaphylaxis, Hives and Swelling         Current Outpatient Medications:     ferrous sulfate (IRON 325) 325 (65 Fe) MG tablet, Take 1 tablet by mouth daily (with breakfast), Disp: 30 tablet, Rfl: 2    vitamin D (ERGOCALCIFEROL) 1.25 MG (23545 UT) CAPS capsule, Take 1 capsule by mouth once a week, Disp: 8 capsule, Rfl: 0    naltrexone-buPROPion (CONTRAVE) 8-90 MG per extended release tablet, Start:1 tab po qam, then 1 tab po bid x 1 wk, then 2 tabs po qam and 1 tab po qpm x 1 wk;  Max 4 tabs/day, Disp: 120 tablet, Rfl: 0    Ferrous Sulfate (IRON) 325 (65 Fe) MG TABS, Take by mouth, Disp: , Rfl:     ibuprofen (ADVIL;MOTRIN) 600 MG tablet, Take 1 tablet by mouth 4 times daily as needed for Pain, Disp: 30 tablet, Rfl: 0    lidocaine (LIDODERM) 5 %, Place 1 patch onto the skin daily 12 hours

## 2024-04-06 ENCOUNTER — APPOINTMENT (OUTPATIENT)
Dept: GENERAL RADIOLOGY | Age: 39
End: 2024-04-06
Payer: COMMERCIAL

## 2024-04-06 ENCOUNTER — APPOINTMENT (OUTPATIENT)
Dept: CT IMAGING | Age: 39
End: 2024-04-06
Payer: COMMERCIAL

## 2024-04-06 ENCOUNTER — HOSPITAL ENCOUNTER (EMERGENCY)
Age: 39
Discharge: HOME | End: 2024-04-06
Attending: EMERGENCY MEDICINE
Payer: COMMERCIAL

## 2024-04-06 VITALS
DIASTOLIC BLOOD PRESSURE: 83 MMHG | OXYGEN SATURATION: 100 % | TEMPERATURE: 98.2 F | RESPIRATION RATE: 18 BRPM | WEIGHT: 227 LBS | BODY MASS INDEX: 34.4 KG/M2 | HEIGHT: 68 IN | HEART RATE: 75 BPM | SYSTOLIC BLOOD PRESSURE: 135 MMHG

## 2024-04-06 DIAGNOSIS — R51.9 NONINTRACTABLE HEADACHE, UNSPECIFIED CHRONICITY PATTERN, UNSPECIFIED HEADACHE TYPE: Primary | ICD-10-CM

## 2024-04-06 LAB
ANION GAP SERPL CALCULATED.3IONS-SCNC: 14 MMOL/L (ref 3–16)
BACTERIA URNS QL MICRO: ABNORMAL /HPF
BASOPHILS # BLD: 0.1 K/UL (ref 0–0.2)
BASOPHILS NFR BLD: 0.5 %
BILIRUB UR QL STRIP.AUTO: NEGATIVE
BUN SERPL-MCNC: 10 MG/DL (ref 7–20)
CALCIUM SERPL-MCNC: 9.6 MG/DL (ref 8.3–10.6)
CHLORIDE SERPL-SCNC: 101 MMOL/L (ref 99–110)
CLARITY UR: CLEAR
CO2 SERPL-SCNC: 22 MMOL/L (ref 21–32)
COLOR UR: YELLOW
CREAT SERPL-MCNC: 0.6 MG/DL (ref 0.6–1.1)
DEPRECATED RDW RBC AUTO: 17.1 % (ref 12.4–15.4)
EOSINOPHIL # BLD: 0.1 K/UL (ref 0–0.6)
EOSINOPHIL NFR BLD: 0.6 %
EPI CELLS #/AREA URNS HPF: ABNORMAL /HPF (ref 0–5)
FLUAV RNA RESP QL NAA+PROBE: NOT DETECTED
FLUBV RNA RESP QL NAA+PROBE: NOT DETECTED
GFR SERPLBLD CREATININE-BSD FMLA CKD-EPI: >90 ML/MIN/{1.73_M2}
GLUCOSE SERPL-MCNC: 104 MG/DL (ref 70–99)
GLUCOSE UR STRIP.AUTO-MCNC: NEGATIVE MG/DL
HCG UR QL: NEGATIVE
HCT VFR BLD AUTO: 32.5 % (ref 36–48)
HGB BLD-MCNC: 10 G/DL (ref 12–16)
HGB UR QL STRIP.AUTO: ABNORMAL
KETONES UR STRIP.AUTO-MCNC: 15 MG/DL
LEUKOCYTE ESTERASE UR QL STRIP.AUTO: NEGATIVE
LYMPHOCYTES # BLD: 3.2 K/UL (ref 1–5.1)
LYMPHOCYTES NFR BLD: 21.8 %
MAGNESIUM SERPL-MCNC: 2.1 MG/DL (ref 1.8–2.4)
MCH RBC QN AUTO: 21.9 PG (ref 26–34)
MCHC RBC AUTO-ENTMCNC: 30.9 G/DL (ref 31–36)
MCV RBC AUTO: 71 FL (ref 80–100)
MONOCYTES # BLD: 0.9 K/UL (ref 0–1.3)
MONOCYTES NFR BLD: 6 %
MUCOUS THREADS #/AREA URNS LPF: ABNORMAL /LPF
NEUTROPHILS # BLD: 10.5 K/UL (ref 1.7–7.7)
NEUTROPHILS NFR BLD: 71.1 %
NITRITE UR QL STRIP.AUTO: NEGATIVE
PH UR STRIP.AUTO: 6 [PH] (ref 5–8)
PLATELET # BLD AUTO: 639 K/UL (ref 135–450)
PMV BLD AUTO: 7.3 FL (ref 5–10.5)
POTASSIUM SERPL-SCNC: 3.5 MMOL/L (ref 3.5–5.1)
PROT UR STRIP.AUTO-MCNC: ABNORMAL MG/DL
RBC # BLD AUTO: 4.57 M/UL (ref 4–5.2)
RBC #/AREA URNS HPF: ABNORMAL /HPF (ref 0–4)
SARS-COV-2 RNA RESP QL NAA+PROBE: NOT DETECTED
SODIUM SERPL-SCNC: 137 MMOL/L (ref 136–145)
SP GR UR STRIP.AUTO: >=1.03 (ref 1–1.03)
UA DIPSTICK W REFLEX MICRO PNL UR: YES
URN SPEC COLLECT METH UR: ABNORMAL
UROBILINOGEN UR STRIP-ACNC: 0.2 E.U./DL
WBC # BLD AUTO: 14.8 K/UL (ref 4–11)
WBC #/AREA URNS HPF: ABNORMAL /HPF (ref 0–5)

## 2024-04-06 PROCEDURE — 71045 X-RAY EXAM CHEST 1 VIEW: CPT

## 2024-04-06 PROCEDURE — 85025 COMPLETE CBC W/AUTO DIFF WBC: CPT

## 2024-04-06 PROCEDURE — 87636 SARSCOV2 & INF A&B AMP PRB: CPT

## 2024-04-06 PROCEDURE — 6360000004 HC RX CONTRAST MEDICATION

## 2024-04-06 PROCEDURE — 99285 EMERGENCY DEPT VISIT HI MDM: CPT

## 2024-04-06 PROCEDURE — 2500000003 HC RX 250 WO HCPCS

## 2024-04-06 PROCEDURE — 2580000003 HC RX 258

## 2024-04-06 PROCEDURE — 84703 CHORIONIC GONADOTROPIN ASSAY: CPT

## 2024-04-06 PROCEDURE — 96375 TX/PRO/DX INJ NEW DRUG ADDON: CPT

## 2024-04-06 PROCEDURE — 6370000000 HC RX 637 (ALT 250 FOR IP)

## 2024-04-06 PROCEDURE — 6360000002 HC RX W HCPCS

## 2024-04-06 PROCEDURE — 81001 URINALYSIS AUTO W/SCOPE: CPT

## 2024-04-06 PROCEDURE — 96361 HYDRATE IV INFUSION ADD-ON: CPT

## 2024-04-06 PROCEDURE — 70496 CT ANGIOGRAPHY HEAD: CPT

## 2024-04-06 PROCEDURE — 96374 THER/PROPH/DIAG INJ IV PUSH: CPT

## 2024-04-06 PROCEDURE — A4216 STERILE WATER/SALINE, 10 ML: HCPCS

## 2024-04-06 PROCEDURE — 83735 ASSAY OF MAGNESIUM: CPT

## 2024-04-06 PROCEDURE — 80048 BASIC METABOLIC PNL TOTAL CA: CPT

## 2024-04-06 RX ORDER — ACETAMINOPHEN 500 MG
1000 TABLET ORAL
Status: COMPLETED | OUTPATIENT
Start: 2024-04-06 | End: 2024-04-06

## 2024-04-06 RX ORDER — ONDANSETRON 2 MG/ML
INJECTION INTRAMUSCULAR; INTRAVENOUS
Status: COMPLETED
Start: 2024-04-06 | End: 2024-04-06

## 2024-04-06 RX ORDER — ONDANSETRON 2 MG/ML
8 INJECTION INTRAMUSCULAR; INTRAVENOUS ONCE
Status: COMPLETED | OUTPATIENT
Start: 2024-04-06 | End: 2024-04-06

## 2024-04-06 RX ORDER — SODIUM CHLORIDE, SODIUM LACTATE, POTASSIUM CHLORIDE, CALCIUM CHLORIDE 600; 310; 30; 20 MG/100ML; MG/100ML; MG/100ML; MG/100ML
1000 INJECTION, SOLUTION INTRAVENOUS CONTINUOUS
Status: DISCONTINUED | OUTPATIENT
Start: 2024-04-06 | End: 2024-04-06 | Stop reason: HOSPADM

## 2024-04-06 RX ORDER — PROCHLORPERAZINE EDISYLATE 5 MG/ML
10 INJECTION INTRAMUSCULAR; INTRAVENOUS ONCE
Status: COMPLETED | OUTPATIENT
Start: 2024-04-06 | End: 2024-04-06

## 2024-04-06 RX ORDER — DIPHENHYDRAMINE HYDROCHLORIDE 50 MG/ML
25 INJECTION INTRAMUSCULAR; INTRAVENOUS ONCE
Status: COMPLETED | OUTPATIENT
Start: 2024-04-06 | End: 2024-04-06

## 2024-04-06 RX ORDER — SODIUM CHLORIDE, SODIUM LACTATE, POTASSIUM CHLORIDE, AND CALCIUM CHLORIDE .6; .31; .03; .02 G/100ML; G/100ML; G/100ML; G/100ML
1000 INJECTION, SOLUTION INTRAVENOUS ONCE
Status: COMPLETED | OUTPATIENT
Start: 2024-04-06 | End: 2024-04-06

## 2024-04-06 RX ORDER — KETOROLAC TROMETHAMINE 30 MG/ML
30 INJECTION, SOLUTION INTRAMUSCULAR; INTRAVENOUS ONCE
Status: COMPLETED | OUTPATIENT
Start: 2024-04-06 | End: 2024-04-06

## 2024-04-06 RX ORDER — FAMOTIDINE 10 MG/ML
INJECTION, SOLUTION INTRAVENOUS
Status: COMPLETED
Start: 2024-04-06 | End: 2024-04-06

## 2024-04-06 RX ADMIN — ONDANSETRON 8 MG: 2 INJECTION INTRAMUSCULAR; INTRAVENOUS at 10:47

## 2024-04-06 RX ADMIN — FAMOTIDINE 20 MG: 10 INJECTION, SOLUTION INTRAVENOUS at 10:51

## 2024-04-06 RX ADMIN — SODIUM CHLORIDE, POTASSIUM CHLORIDE, SODIUM LACTATE AND CALCIUM CHLORIDE 1000 ML: 600; 310; 30; 20 INJECTION, SOLUTION INTRAVENOUS at 10:52

## 2024-04-06 RX ADMIN — SODIUM CHLORIDE, SODIUM LACTATE, POTASSIUM CHLORIDE, AND CALCIUM CHLORIDE 1000 ML: .6; .31; .03; .02 INJECTION, SOLUTION INTRAVENOUS at 14:46

## 2024-04-06 RX ADMIN — KETOROLAC TROMETHAMINE 30 MG: 30 INJECTION, SOLUTION INTRAMUSCULAR; INTRAVENOUS at 10:41

## 2024-04-06 RX ADMIN — IOPAMIDOL 75 ML: 755 INJECTION, SOLUTION INTRAVENOUS at 12:41

## 2024-04-06 RX ADMIN — ACETAMINOPHEN 1000 MG: 500 TABLET ORAL at 11:00

## 2024-04-06 RX ADMIN — PROCHLORPERAZINE EDISYLATE 10 MG: 5 INJECTION INTRAMUSCULAR; INTRAVENOUS at 10:52

## 2024-04-06 RX ADMIN — DIPHENHYDRAMINE HYDROCHLORIDE 25 MG: 50 INJECTION, SOLUTION INTRAMUSCULAR; INTRAVENOUS at 10:48

## 2024-04-06 ASSESSMENT — PAIN SCALES - GENERAL
PAINLEVEL_OUTOF10: 10
PAINLEVEL_OUTOF10: 2
PAINLEVEL_OUTOF10: 10
PAINLEVEL_OUTOF10: 2
PAINLEVEL_OUTOF10: 4
PAINLEVEL_OUTOF10: 10

## 2024-04-06 ASSESSMENT — ENCOUNTER SYMPTOMS
GASTROINTESTINAL NEGATIVE: 1
COUGH: 1
EYES NEGATIVE: 1
ALLERGIC/IMMUNOLOGIC NEGATIVE: 1

## 2024-04-06 ASSESSMENT — PAIN DESCRIPTION - PAIN TYPE: TYPE: ACUTE PAIN

## 2024-04-06 ASSESSMENT — PAIN DESCRIPTION - LOCATION
LOCATION: HEAD
LOCATION: HEAD

## 2024-04-06 ASSESSMENT — LIFESTYLE VARIABLES
HOW OFTEN DO YOU HAVE A DRINK CONTAINING ALCOHOL: NEVER
HOW MANY STANDARD DRINKS CONTAINING ALCOHOL DO YOU HAVE ON A TYPICAL DAY: PATIENT DOES NOT DRINK

## 2024-04-06 ASSESSMENT — PAIN - FUNCTIONAL ASSESSMENT: PAIN_FUNCTIONAL_ASSESSMENT: 0-10

## 2024-04-06 ASSESSMENT — PAIN DESCRIPTION - FREQUENCY: FREQUENCY: CONTINUOUS

## 2024-04-06 ASSESSMENT — PAIN DESCRIPTION - DESCRIPTORS: DESCRIPTORS: SHARP;SQUEEZING

## 2024-04-06 ASSESSMENT — PAIN DESCRIPTION - ONSET: ONSET: PROGRESSIVE

## 2024-04-06 NOTE — ED PROVIDER NOTES
ED Attending Attestation Note     Date of evaluation: 4/6/2024    This patient was seen by the resident.  I have seen and examined the patient, agree with the workup, evaluation, management and diagnosis. The care plan has been discussed.  My assessment reveals adult female with complaint of headache for the last week, improved briefly with Toradol which does get worse again after she had some cough at home.  Grossly normal neurological exam.  She is improving with migraine cocktail though not completely resolved, still appears fairly symptomatic, and we do have some concern that given the chronicity of this there may be something more such as dural sinus thrombosis.  CTV was performed which shows no evidence of thrombosis.  Discussed gradual onset of pain over the last week, temporally does not appear consistent with subarachnoid hemorrhage.  No meningismus.  Pain did completely resolve while here in the emergency department.     John Alexandre MD  04/06/24 [id number]

## 2024-04-06 NOTE — DISCHARGE INSTRUCTIONS
You were seen in the ED today with a concern of headache for 2 weeks. You received toradol, compazine, benadryl and tylenol with resolution of your headache. You also received IV fluids. You also received zofran and pepcid for your nausea and vomiting. Your CT head did not show any acute changes.     Please follow up with you primary care physician for further evaluation of your cough if it does not improve over the next week. Please return to the ED for worsening of your symptoms. Please take honey for the cough symptoms. You can take a tablespoon 3x daily or as needed for cough.

## 2024-04-06 NOTE — ED PROVIDER NOTES
Pcp    Schedule an appointment as soon as possible for a visit in 1 week  for evalaution of cough      DISCHARGE MEDICATIONS:  Discharge Medication List as of 4/6/2024  4:24 PM          DISPOSITION Decision To Discharge 04/06/2024 04:16:36 PM        Diagnostic Results and Other Data     RADIOLOGY:  CTA VENOUS HEAD W WO CONTRAST   Final Result   1. Normal CT venogram of the head.      XR CHEST PORTABLE   Final Result   1.  No evidence of acute cardiopulmonary disease.          LABS:   Results for orders placed or performed during the hospital encounter of 04/06/24   COVID-19 & Influenza Combo    Specimen: Nasopharyngeal Swab   Result Value Ref Range    SARS-CoV-2 RNA, RT PCR NOT DETECTED NOT DETECTED    INFLUENZA A NOT DETECTED NOT DETECTED    INFLUENZA B NOT DETECTED NOT DETECTED   CBC with Auto Differential   Result Value Ref Range    WBC 14.8 (H) 4.0 - 11.0 K/uL    RBC 4.57 4.00 - 5.20 M/uL    Hemoglobin 10.0 (L) 12.0 - 16.0 g/dL    Hematocrit 32.5 (L) 36.0 - 48.0 %    MCV 71.0 (L) 80.0 - 100.0 fL    MCH 21.9 (L) 26.0 - 34.0 pg    MCHC 30.9 (L) 31.0 - 36.0 g/dL    RDW 17.1 (H) 12.4 - 15.4 %    Platelets 639 (H) 135 - 450 K/uL    MPV 7.3 5.0 - 10.5 fL    Neutrophils % 71.1 %    Lymphocytes % 21.8 %    Monocytes % 6.0 %    Eosinophils % 0.6 %    Basophils % 0.5 %    Neutrophils Absolute 10.5 (H) 1.7 - 7.7 K/uL    Lymphocytes Absolute 3.2 1.0 - 5.1 K/uL    Monocytes Absolute 0.9 0.0 - 1.3 K/uL    Eosinophils Absolute 0.1 0.0 - 0.6 K/uL    Basophils Absolute 0.1 0.0 - 0.2 K/uL   BMP w/ Reflex to MG   Result Value Ref Range    Sodium 137 136 - 145 mmol/L    Potassium reflex Magnesium 3.5 3.5 - 5.1 mmol/L    Chloride 101 99 - 110 mmol/L    CO2 22 21 - 32 mmol/L    Anion Gap 14 3 - 16    Glucose 104 (H) 70 - 99 mg/dL    BUN 10 7 - 20 mg/dL    Creatinine 0.6 0.6 - 1.1 mg/dL    Est, Glom Filt Rate >90 >60    Calcium 9.6 8.3 - 10.6 mg/dL   Urinalysis with Microscopic   Result Value Ref Range    Color, UA Yellow Straw/Yellow  Positive for cough.    Cardiovascular: Negative.    Gastrointestinal: Negative.    Endocrine: Negative.    Genitourinary: Negative.    Musculoskeletal: Negative.    Allergic/Immunologic: Negative.    Neurological: Negative.    Hematological: Negative.    Psychiatric/Behavioral: Negative.         Past Medical, Surgical, Family, and Social History     She has a past medical history of Arthritis and Sleep apnea.  She has a past surgical history that includes  section; Tubal ligation; Upper gastrointestinal endoscopy (N/A, 2020); back surgery; and Sleeve Gastrectomy (N/A, 2021).  Her family history includes Sleep Apnea in her maternal grandmother.  She reports that she has never smoked. She has never used smokeless tobacco. She reports current alcohol use. She reports that she does not use drugs.    Medications     Discharge Medication List as of 2024  4:24 PM        CONTINUE these medications which have NOT CHANGED    Details   !! ferrous sulfate (IRON 325) 325 (65 Fe) MG tablet Take 1 tablet by mouth daily (with breakfast), Disp-30 tablet, R-2Normal      vitamin D (ERGOCALCIFEROL) 1.25 MG (58584 UT) CAPS capsule Take 1 capsule by mouth once a week, Disp-8 capsule, R-0Normal      naltrexone-buPROPion (CONTRAVE) 8-90 MG per extended release tablet Start:1 tab po qam, then 1 tab po bid x 1 wk, then 2 tabs po qam and 1 tab po qpm x 1 wk; Max 4 tabs/day, Disp-120 tablet, R-0Normal      !! Ferrous Sulfate (IRON) 325 (65 Fe) MG TABS Take by mouthHistorical Med      ibuprofen (ADVIL;MOTRIN) 600 MG tablet Take 1 tablet by mouth 4 times daily as needed for Pain, Disp-30 tablet, R-0Normal      lidocaine (LIDODERM) 5 % Place 1 patch onto the skin daily 12 hours on, 12 hours off., Disp-30 patch, R-0Normal      Multiple Vitamin (MULTIVITAMIN, BARIATRIC FUSION COMPLETE, CHEW TAB) Take 4 tablets by mouth dailyHistorical Med      loratadine (CLARITIN) 10 MG capsule Take 1 capsule by mouth dailyHistorical Med

## 2024-04-12 ENCOUNTER — HOSPITAL ENCOUNTER (EMERGENCY)
Age: 39
Discharge: HOME | End: 2024-04-12
Attending: EMERGENCY MEDICINE
Payer: COMMERCIAL

## 2024-04-12 VITALS
HEART RATE: 96 BPM | RESPIRATION RATE: 18 BRPM | TEMPERATURE: 98 F | OXYGEN SATURATION: 96 % | DIASTOLIC BLOOD PRESSURE: 86 MMHG | BODY MASS INDEX: 34.19 KG/M2 | WEIGHT: 225.6 LBS | HEIGHT: 68 IN | SYSTOLIC BLOOD PRESSURE: 155 MMHG

## 2024-04-12 DIAGNOSIS — R51.9 NONINTRACTABLE HEADACHE, UNSPECIFIED CHRONICITY PATTERN, UNSPECIFIED HEADACHE TYPE: Primary | ICD-10-CM

## 2024-04-12 PROCEDURE — 99284 EMERGENCY DEPT VISIT MOD MDM: CPT

## 2024-04-12 PROCEDURE — 96374 THER/PROPH/DIAG INJ IV PUSH: CPT

## 2024-04-12 PROCEDURE — 6360000002 HC RX W HCPCS: Performed by: EMERGENCY MEDICINE

## 2024-04-12 PROCEDURE — 2580000003 HC RX 258: Performed by: EMERGENCY MEDICINE

## 2024-04-12 PROCEDURE — 96375 TX/PRO/DX INJ NEW DRUG ADDON: CPT

## 2024-04-12 RX ORDER — BUTALBITAL, ACETAMINOPHEN AND CAFFEINE 300; 40; 50 MG/1; MG/1; MG/1
1 CAPSULE ORAL EVERY 4 HOURS PRN
Qty: 20 CAPSULE | Refills: 0 | Status: SHIPPED | OUTPATIENT
Start: 2024-04-12

## 2024-04-12 RX ORDER — DROPERIDOL 2.5 MG/ML
1.25 INJECTION, SOLUTION INTRAMUSCULAR; INTRAVENOUS ONCE
Status: COMPLETED | OUTPATIENT
Start: 2024-04-12 | End: 2024-04-12

## 2024-04-12 RX ORDER — SODIUM CHLORIDE, SODIUM LACTATE, POTASSIUM CHLORIDE, AND CALCIUM CHLORIDE .6; .31; .03; .02 G/100ML; G/100ML; G/100ML; G/100ML
1000 INJECTION, SOLUTION INTRAVENOUS ONCE
Status: COMPLETED | OUTPATIENT
Start: 2024-04-12 | End: 2024-04-12

## 2024-04-12 RX ORDER — KETOROLAC TROMETHAMINE 30 MG/ML
15 INJECTION, SOLUTION INTRAMUSCULAR; INTRAVENOUS ONCE
Status: COMPLETED | OUTPATIENT
Start: 2024-04-12 | End: 2024-04-12

## 2024-04-12 RX ORDER — LORAZEPAM 2 MG/ML
1 INJECTION INTRAMUSCULAR ONCE
Status: COMPLETED | OUTPATIENT
Start: 2024-04-12 | End: 2024-04-12

## 2024-04-12 RX ORDER — HYDROMORPHONE HYDROCHLORIDE 1 MG/ML
1 INJECTION, SOLUTION INTRAMUSCULAR; INTRAVENOUS; SUBCUTANEOUS ONCE
Status: COMPLETED | OUTPATIENT
Start: 2024-04-12 | End: 2024-04-12

## 2024-04-12 RX ADMIN — KETOROLAC TROMETHAMINE 15 MG: 30 INJECTION, SOLUTION INTRAMUSCULAR; INTRAVENOUS at 16:04

## 2024-04-12 RX ADMIN — SODIUM CHLORIDE, POTASSIUM CHLORIDE, SODIUM LACTATE AND CALCIUM CHLORIDE 1000 ML: 600; 310; 30; 20 INJECTION, SOLUTION INTRAVENOUS at 16:03

## 2024-04-12 RX ADMIN — HYDROMORPHONE HYDROCHLORIDE 1 MG: 1 INJECTION, SOLUTION INTRAMUSCULAR; INTRAVENOUS; SUBCUTANEOUS at 18:46

## 2024-04-12 RX ADMIN — WATER 60 MG: 1 INJECTION INTRAMUSCULAR; INTRAVENOUS; SUBCUTANEOUS at 18:46

## 2024-04-12 RX ADMIN — LORAZEPAM 1 MG: 2 INJECTION INTRAMUSCULAR; INTRAVENOUS at 20:23

## 2024-04-12 RX ADMIN — DROPERIDOL 1.25 MG: 2.5 INJECTION, SOLUTION INTRAMUSCULAR; INTRAVENOUS at 16:04

## 2024-04-12 ASSESSMENT — ENCOUNTER SYMPTOMS
NAUSEA: 1
VOMITING: 0
PHOTOPHOBIA: 1

## 2024-04-12 ASSESSMENT — PAIN DESCRIPTION - DESCRIPTORS
DESCRIPTORS: ACHING
DESCRIPTORS: DISCOMFORT

## 2024-04-12 ASSESSMENT — PAIN DESCRIPTION - LOCATION
LOCATION: HEAD

## 2024-04-12 ASSESSMENT — PAIN SCALES - GENERAL
PAINLEVEL_OUTOF10: 4
PAINLEVEL_OUTOF10: 6
PAINLEVEL_OUTOF10: 10

## 2024-04-12 ASSESSMENT — PAIN - FUNCTIONAL ASSESSMENT: PAIN_FUNCTIONAL_ASSESSMENT: 0-10

## 2024-04-12 NOTE — ED NOTES
Pt states her headache pain is 6/10 at present. Dr Aguero advised.     Zeyad Hernandez, RN  04/12/24 0038

## 2024-04-12 NOTE — ED PROVIDER NOTES
Headaches or Migraine     Dispense:  20 capsule     Refill:  0       CONSULTS:  None    Review of Systems     Review of Systems   Constitutional:  Negative for fever.   Eyes:  Positive for photophobia. Negative for visual disturbance.   Gastrointestinal:  Positive for nausea. Negative for vomiting.   Neurological:  Positive for headaches. Negative for weakness and numbness.   All other systems reviewed and are negative.      Past Medical, Surgical, Family, and Social History     She has a past medical history of Arthritis and Sleep apnea.  She has a past surgical history that includes  section; Tubal ligation; Upper gastrointestinal endoscopy (N/A, 2020); back surgery; and Sleeve Gastrectomy (N/A, 2021).  Her family history includes Sleep Apnea in her maternal grandmother.  She reports that she has never smoked. She has never used smokeless tobacco. She reports current alcohol use. She reports that she does not use drugs.    Medications     Previous Medications    FERROUS SULFATE (IRON 325) 325 (65 FE) MG TABLET    Take 1 tablet by mouth daily (with breakfast)    FERROUS SULFATE (IRON) 325 (65 FE) MG TABS    Take by mouth    FLUTICASONE (FLONASE) 50 MCG/ACT NASAL SPRAY    2 sprays by Nasal route daily 2 sprays each nostril daily    IBUPROFEN (ADVIL;MOTRIN) 600 MG TABLET    Take 1 tablet by mouth 4 times daily as needed for Pain    LIDOCAINE (LIDODERM) 5 %    Place 1 patch onto the skin daily 12 hours on, 12 hours off.    LORATADINE (CLARITIN) 10 MG CAPSULE    Take 1 capsule by mouth daily    MULTIPLE VITAMIN (MULTIVITAMIN, BARIATRIC FUSION COMPLETE, CHEW TAB)    Take 4 tablets by mouth daily    NALTREXONE-BUPROPION (CONTRAVE) 8-90 MG PER EXTENDED RELEASE TABLET    Start:1 tab po qam, then 1 tab po bid x 1 wk, then 2 tabs po qam and 1 tab po qpm x 1 wk; Max 4 tabs/day    VITAMIN D (ERGOCALCIFEROL) 1.25 MG (72689 UT) CAPS CAPSULE    Take 1 capsule by mouth once a week       Allergies     She is

## 2024-04-13 NOTE — ED NOTES
Patient prepared for and ready to be discharged. Patient discharged at this time in no acute distress after verbalizing understanding of discharge instructions. Patient left after receiving After Visit Summary instructions.       Zeyad Hernandez RN  04/12/24 [id number]

## 2024-04-17 ENCOUNTER — OFFICE VISIT (OUTPATIENT)
Dept: BARIATRICS/WEIGHT MGMT | Age: 39
End: 2024-04-17
Payer: COMMERCIAL

## 2024-04-17 VITALS
HEART RATE: 78 BPM | DIASTOLIC BLOOD PRESSURE: 86 MMHG | WEIGHT: 234.2 LBS | BODY MASS INDEX: 35.49 KG/M2 | SYSTOLIC BLOOD PRESSURE: 131 MMHG | HEIGHT: 68 IN

## 2024-04-17 DIAGNOSIS — E66.01 SEVERE OBESITY (BMI 35.0-35.9 WITH COMORBIDITY) (HCC): Primary | ICD-10-CM

## 2024-04-17 DIAGNOSIS — E88.810 METABOLIC SYNDROME: ICD-10-CM

## 2024-04-17 DIAGNOSIS — K44.9 HIATAL HERNIA WITH GERD: ICD-10-CM

## 2024-04-17 DIAGNOSIS — Z98.84 S/P LAPAROSCOPIC SLEEVE GASTRECTOMY: ICD-10-CM

## 2024-04-17 DIAGNOSIS — K21.9 HIATAL HERNIA WITH GERD: ICD-10-CM

## 2024-04-17 PROCEDURE — G8427 DOCREV CUR MEDS BY ELIG CLIN: HCPCS | Performed by: SURGERY

## 2024-04-17 PROCEDURE — G8417 CALC BMI ABV UP PARAM F/U: HCPCS | Performed by: SURGERY

## 2024-04-17 PROCEDURE — 99214 OFFICE O/P EST MOD 30 MIN: CPT | Performed by: SURGERY

## 2024-04-17 PROCEDURE — 1036F TOBACCO NON-USER: CPT | Performed by: SURGERY

## 2024-04-17 RX ORDER — KETOROLAC TROMETHAMINE 10 MG/1
10 TABLET, FILM COATED ORAL EVERY 6 HOURS PRN
COMMUNITY

## 2024-04-17 RX ORDER — PROMETHAZINE HYDROCHLORIDE 25 MG/1
25 TABLET ORAL EVERY 6 HOURS PRN
COMMUNITY

## 2024-04-18 NOTE — PROGRESS NOTES
Dietary Assessment Note      Vitals:   Vitals:    24 1219   BP: 131/86   Pulse: 78   Weight: 106.2 kg (234 lb 3.2 oz)   Height: 1.727 m (5' 8\")    Patient lost 3 lbs over 1 year.    Total Weight Loss: 48.8 lbs    Labs reviewed: labs are reviewed, up to date and normal    Protein intake: Patient not tracking     Fluid intake: 48-64 oz/day    Multivitamin/mineral intake:  none    Calcium intake: none    Other: Vitamin C       Exercise:  walking at work       Nutrition Assessment: 3 year post-op visit. Pt reports more eating out and snacking lately in evening.  Pt states that she has been under a lot of stress with her daughter's graduation date approaching.   Pt wakes up at 7 am.      Breakfast: none  Snack: none  Lunch: 11 am. Turkey lunch meat sandwich with chips   Snack: 4 pm.  Fruit or turkey lunch meat sandwich or salad-spinach, cheese, italian dressing with chicken and croutons   Dinner: 6 or 7 pm.  Beans and rice-2 cups/day or salmon and broccoli or salad      Snack: sherbet-1/2 cup or cookie  or pineapple      Fluids:  water-80 oz./day, orange juice or coke or hot tea on occasion       Amount able to eat per sittin cups total      Following  rule: Yes    Food Intolerances/issues: none    Client Concerns: skin removal-pt reports goal weight is at 200 lbs.        Goals:   Eat breakfast daily  Decrease carbohydrate portion and increase non-starchy vegetables  Work on limiting total portion to 8-12 oz. Total   Choose protein based snack in evening-reviewed options   Eliminate juice/pop    Restart MVI and calcium daily-refer to MVI alternative handout    Handouts:  MVI alternative for Sleeve Pts., Protein bars and shakes         Plan: F/U per provider       Candida Stuart, RD, LD   
dietary and lifestyle modifications.  [x] Return for follow-up next month.    RTC in 12 months  Diet and Exercise      Patient advised that its their responsibility to follow up for studies and/or labs ordered today.    IEvita MA am scribing for and in the presence of Dr. Rasheed DO on this date of 04/18/24 at 8:28 AM    IStar DO personally performed the services described in this documentation as scribed by the Medical Assistant Evita Josue in my presence and it is both accurate and complete. 04/28/24

## 2024-06-13 ENCOUNTER — TELEPHONE (OUTPATIENT)
Dept: BARIATRICS/WEIGHT MGMT | Age: 39
End: 2024-06-13

## 2024-06-18 ENCOUNTER — TELEPHONE (OUTPATIENT)
Dept: BARIATRICS/WEIGHT MGMT | Age: 39
End: 2024-06-18

## 2024-06-19 ENCOUNTER — OFFICE VISIT (OUTPATIENT)
Dept: BARIATRICS/WEIGHT MGMT | Age: 39
End: 2024-06-19

## 2024-06-19 VITALS
HEART RATE: 87 BPM | BODY MASS INDEX: 35.16 KG/M2 | DIASTOLIC BLOOD PRESSURE: 86 MMHG | WEIGHT: 232 LBS | OXYGEN SATURATION: 96 % | RESPIRATION RATE: 15 BRPM | SYSTOLIC BLOOD PRESSURE: 126 MMHG | HEIGHT: 68 IN

## 2024-06-19 DIAGNOSIS — E66.01 SEVERE OBESITY (BMI 35.0-35.9 WITH COMORBIDITY) (HCC): Primary | ICD-10-CM

## 2024-06-19 NOTE — PROGRESS NOTES
Lynsey Cota is a 38 y.o. female with a date of birth of 1985.    Vitals:    06/19/24 0819   BP: 126/86   Site: Right Upper Arm   Position: Sitting   Pulse: 87   Resp: 15   SpO2: 96%   Weight: 105.2 kg (232 lb)   Height: 1.715 m (5' 7.5\")    BMI: Body mass index is 35.8 kg/m². Obesity Classification: Class II    Weight History:   Wt Readings from Last 3 Encounters:   06/19/24 105.2 kg (232 lb)   04/17/24 106.2 kg (234 lb 3.2 oz)   04/12/24 102.3 kg (225 lb 9.6 oz)       Pt attended Medical Weight Management Seminar. Patient was educated on low-carb diet protocol. Nutrition and habit guidelines were discussed and written information was provided. Bariatric Nutrition Questionnaire completed during class and scanned into media.       Pt is allergic to tomato    Goals  Weight: 190-200 lbs  Health Improvement: more movement    Assessment  Nutritional Needs: RMR=(9.99 x 105.2) + (6.25 x 172.7) - (4.92 x 38 y.o.) -161= 1792 kcal x 1.3 (sedentary activity factor)= 2330 kcal - 1000 (for 2 lb weight loss/week)= 1330 kcal.    Patient has participated in the following weight loss programs: nutrition counseling with RD and low carb.   Patient has participated in weight loss medications. Adipex + Contrave  Patient has not participated in meal replacement/liquid diets.  Patient does have history of bariatric surgery. Sleeve 2021    Pt noted following 30/30/30 and aims for 45 minutes exercise/day. Portions are 2-4 oz protein + 1/2-1.5 cups sides. Also noted alcohol daily.     Plan  Plan/Recommendations: General weight loss/lifestyle modification strategies discussed (elicit support from others; identify saboteurs; non-food rewards, etc).  Diet interventions: 1200 kcal LC.  Regular aerobic exercise program discussed.  Optifast:  Not Interested  Diet Medications:  Not Interested       PES Statement: Overweight/Obesity related to lack of exercise, sedentary lifestyle, unhealthy eating habits, and unsuccessful diet attempts as

## 2024-06-24 ENCOUNTER — INITIAL CONSULT (OUTPATIENT)
Dept: SURGERY | Age: 39
End: 2024-06-24
Payer: COMMERCIAL

## 2024-06-24 VITALS
SYSTOLIC BLOOD PRESSURE: 122 MMHG | HEART RATE: 80 BPM | DIASTOLIC BLOOD PRESSURE: 83 MMHG | HEIGHT: 68 IN | WEIGHT: 235 LBS | TEMPERATURE: 98.2 F | BODY MASS INDEX: 35.61 KG/M2 | OXYGEN SATURATION: 100 % | RESPIRATION RATE: 16 BRPM

## 2024-06-24 DIAGNOSIS — L98.7 EXCESS SKIN OF ARM: Primary | ICD-10-CM

## 2024-06-24 PROCEDURE — 99214 OFFICE O/P EST MOD 30 MIN: CPT | Performed by: SURGERY

## 2024-06-24 PROCEDURE — 1036F TOBACCO NON-USER: CPT | Performed by: SURGERY

## 2024-06-24 PROCEDURE — G8417 CALC BMI ABV UP PARAM F/U: HCPCS | Performed by: SURGERY

## 2024-06-24 PROCEDURE — G8427 DOCREV CUR MEDS BY ELIG CLIN: HCPCS | Performed by: SURGERY
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
## 2024-09-23 ENCOUNTER — OFFICE VISIT (OUTPATIENT)
Dept: SURGERY | Age: 39
End: 2024-09-23
Payer: COMMERCIAL

## 2024-09-23 VITALS
WEIGHT: 230 LBS | OXYGEN SATURATION: 94 % | SYSTOLIC BLOOD PRESSURE: 118 MMHG | RESPIRATION RATE: 16 BRPM | BODY MASS INDEX: 35.47 KG/M2 | HEART RATE: 94 BPM | DIASTOLIC BLOOD PRESSURE: 84 MMHG | TEMPERATURE: 98.2 F

## 2024-09-23 DIAGNOSIS — L98.7 EXCESS SKIN OF ARM: Primary | ICD-10-CM

## 2024-09-23 PROCEDURE — 1036F TOBACCO NON-USER: CPT | Performed by: SURGERY

## 2024-09-23 PROCEDURE — 99213 OFFICE O/P EST LOW 20 MIN: CPT | Performed by: SURGERY

## 2024-09-23 PROCEDURE — G8427 DOCREV CUR MEDS BY ELIG CLIN: HCPCS | Performed by: SURGERY

## 2024-09-23 PROCEDURE — G8417 CALC BMI ABV UP PARAM F/U: HCPCS | Performed by: SURGERY

## (undated) DEVICE — SURE SET-DOUBLE BASIN-LF: Brand: MEDLINE INDUSTRIES, INC.

## (undated) DEVICE — ARM DRAPE

## (undated) DEVICE — KIT,ANTI FOG,W/SPONGE & FLUID,SOFT PACK: Brand: MEDLINE

## (undated) DEVICE — PLATE ES AD W 9FT CRD 2

## (undated) DEVICE — [HIGH FLOW INSUFFLATOR,  DO NOT USE IF PACKAGE IS DAMAGED,  KEEP DRY,  KEEP AWAY FROM SUNLIGHT,  PROTECT FROM HEAT AND RADIOACTIVE SOURCES.]: Brand: PNEUMOSURE

## (undated) DEVICE — NEEDLE HYPO 22GA L1.5IN BLK POLYPR HUB S STL REG BVL STR

## (undated) DEVICE — STAPLER 60 RELOAD GREEN: Brand: SUREFORM

## (undated) DEVICE — STAPLER 60 RELOAD BLUE: Brand: SUREFORM

## (undated) DEVICE — SYRINGE MED 10ML SLIP TIP BLNT FILL AND LUERLOCK DISP

## (undated) DEVICE — SYRINGE MED 10ML TRNSLUC BRL PLUNG BLK MRK POLYPR CTRL

## (undated) DEVICE — PROGRASP FORCEPS: Brand: ENDOWRIST

## (undated) DEVICE — SUTURE VCRL SZ 0 L54IN ABSRB VLT W/O NDL POLYGLACTIN 910 J616H

## (undated) DEVICE — STAPLER 60: Brand: SUREFORM

## (undated) DEVICE — APPLICATOR MEDICATED 26 CC SOLUTION HI LT ORNG CHLORAPREP

## (undated) DEVICE — SUTURE VCRL SZ 2-0 L27IN ABSRB UD L26MM SH 1/2 CIR J417H

## (undated) DEVICE — SEAL

## (undated) DEVICE — CANNULA SEAL

## (undated) DEVICE — LARGE NEEDLE DRIVER: Brand: ENDOWRIST

## (undated) DEVICE — CADIERE FORCEPS: Brand: ENDOWRIST

## (undated) DEVICE — BLADELESS OBTURATOR, LONG: Brand: WECK VISTA

## (undated) DEVICE — DRAPE,LAP,CHOLE,W/TROUGHS,STERILE: Brand: MEDLINE

## (undated) DEVICE — 40583 XL ADVANCED TRENDELENBURG POSITIONING KIT: Brand: 40583 XL ADVANCED TRENDELENBURG POSITIONING KIT

## (undated) DEVICE — 60 ML SYRINGE,CATHETER TIP: Brand: MONOJECT

## (undated) DEVICE — TROCAR: Brand: KII FIOS FIRST ENTRY

## (undated) DEVICE — PUMP SUC IRR TBNG L10FT W/ HNDPC ASSEMB STRYKEFLOW 2

## (undated) DEVICE — DEVICE CLSR 10/12MM XL PRT SYS SUT PASS ST DISP CARTER

## (undated) DEVICE — JEWISH HOSPITAL TURNOVER KIT: Brand: MEDLINE INDUSTRIES, INC.

## (undated) DEVICE — SUTURE ETHBND EXCEL SZ 0 L30IN NONABSORBABLE GRN L26MM SH X834H

## (undated) DEVICE — GARMENT,MEDLINE,DVT,INT,CALF,LG, GEN2: Brand: MEDLINE

## (undated) DEVICE — BOWL MED L 32OZ PLAS W/ MOLD GRAD EZ OPN PEEL PCH

## (undated) DEVICE — FORCEPS BX L240CM JAW DIA2.8MM L CAP W/ NDL MIC MESH TOOTH

## (undated) DEVICE — SPONGE,LAP,4"X18",XR,ST,5/PK,40PK/CS: Brand: MEDLINE INDUSTRIES, INC.

## (undated) DEVICE — SURGICAL SET UP - SURE SET: Brand: MEDLINE INDUSTRIES, INC.

## (undated) DEVICE — VESSEL SEALER EXTEND: Brand: ENDOWRIST

## (undated) DEVICE — BLANKET WRM W29.9XL79.1IN UP BODY FORC AIR MISTRAL-AIR

## (undated) DEVICE — SUTURE VCRL SZ 4-0 L18IN ABSRB UD L19MM PS-2 3/8 CIR PRIM J496H

## (undated) DEVICE — SOLUTION INJ LR VISIV 1000ML BG

## (undated) DEVICE — ADHESIVE SKIN CLSR 0.7ML TOP DERMBND ADV

## (undated) DEVICE — ELECTROSURGICAL PENCIL ROCKER SWITCH NON COATED BLADE ELECTRODE 10 FT (3 M) CORD HOLSTER: Brand: MEGADYNE

## (undated) DEVICE — MASK CAPNOGRAPHY AD W35IN DIA58IN SAMP LN L10FT O2 LN

## (undated) DEVICE — BINDER ABD H12IN FOR 62-74IN WAIST UNIV 4 PNL PREM DSGN E

## (undated) DEVICE — NEEDLE INSUF L150MM DIA2MM DISP FOR PNEUMOPERI ENDOPATH

## (undated) DEVICE — GLOVE SURG SZ 75 L12IN THK75MIL DK GRN LTX FREE

## (undated) DEVICE — GOWN,SIRUS,POLYRNF,BRTHSLV,LG,30/CS: Brand: MEDLINE

## (undated) DEVICE — GLOVE ORANGE PI 7   MSG9070

## (undated) DEVICE — SOLUTION ANTIFOG VIS SYS CLEARIFY LAPSCP

## (undated) DEVICE — VISIGI 3D®  CALIBRATION SYSTEM  SIZE 36FR STD W/ BULB: Brand: BOEHRINGER® VISIGI 3D™ SLEEVE GASTRECTOMY CALIBRATION SYSTEM, SIZE 36FR W/BULB

## (undated) DEVICE — AGENT HEMSTAT W2XL4IN OXIDIZED REGENERATED CELOS ABSRB

## (undated) DEVICE — SYSTEM SMK EVAC LAP TBNG FILTER HSNG BENT STYL PNK SEE CLR

## (undated) DEVICE — LAPAROSCOPIC SCISSORS: Brand: EPIX LAPAROSCOPIC SCISSORS